# Patient Record
Sex: FEMALE | Race: WHITE | NOT HISPANIC OR LATINO
[De-identification: names, ages, dates, MRNs, and addresses within clinical notes are randomized per-mention and may not be internally consistent; named-entity substitution may affect disease eponyms.]

---

## 2017-01-30 ENCOUNTER — MEDICATION RENEWAL (OUTPATIENT)
Age: 67
End: 2017-01-30

## 2017-04-05 ENCOUNTER — APPOINTMENT (OUTPATIENT)
Dept: FAMILY MEDICINE | Facility: CLINIC | Age: 67
End: 2017-04-05

## 2017-08-16 ENCOUNTER — APPOINTMENT (OUTPATIENT)
Dept: ELECTROPHYSIOLOGY | Facility: CLINIC | Age: 67
End: 2017-08-16
Payer: MEDICARE

## 2017-08-16 VITALS
HEART RATE: 94 BPM | WEIGHT: 117 LBS | SYSTOLIC BLOOD PRESSURE: 100 MMHG | DIASTOLIC BLOOD PRESSURE: 50 MMHG | HEIGHT: 60 IN | BODY MASS INDEX: 22.97 KG/M2

## 2017-08-16 DIAGNOSIS — I47.1 SUPRAVENTRICULAR TACHYCARDIA: ICD-10-CM

## 2017-08-16 PROCEDURE — 93000 ELECTROCARDIOGRAM COMPLETE: CPT | Mod: 59

## 2017-08-16 PROCEDURE — 93280 PM DEVICE PROGR EVAL DUAL: CPT

## 2017-08-16 PROCEDURE — 99204 OFFICE O/P NEW MOD 45 MIN: CPT

## 2019-04-17 ENCOUNTER — APPOINTMENT (OUTPATIENT)
Dept: PULMONOLOGY | Facility: CLINIC | Age: 69
End: 2019-04-17

## 2020-06-03 ENCOUNTER — APPOINTMENT (OUTPATIENT)
Dept: FAMILY MEDICINE | Facility: CLINIC | Age: 70
End: 2020-06-03
Payer: MEDICARE

## 2020-06-03 VITALS
DIASTOLIC BLOOD PRESSURE: 62 MMHG | RESPIRATION RATE: 16 BRPM | SYSTOLIC BLOOD PRESSURE: 102 MMHG | HEIGHT: 60 IN | WEIGHT: 118 LBS | HEART RATE: 82 BPM | OXYGEN SATURATION: 95 % | BODY MASS INDEX: 23.16 KG/M2

## 2020-06-03 DIAGNOSIS — R35.0 FREQUENCY OF MICTURITION: ICD-10-CM

## 2020-06-03 DIAGNOSIS — Z72.0 TOBACCO USE: ICD-10-CM

## 2020-06-03 DIAGNOSIS — Z00.00 ENCOUNTER FOR GENERAL ADULT MEDICAL EXAMINATION W/OUT ABNORMAL FINDINGS: ICD-10-CM

## 2020-06-03 LAB
BILIRUB UR QL STRIP: NEGATIVE
GLUCOSE UR-MCNC: NEGATIVE
HCG UR QL: 0.2 EU/DL
HGB UR QL STRIP.AUTO: NEGATIVE
KETONES UR-MCNC: NEGATIVE
LEUKOCYTE ESTERASE UR QL STRIP: NEGATIVE
NITRITE UR QL STRIP: NEGATIVE
PH UR STRIP: 5.5
PROT UR STRIP-MCNC: NEGATIVE
SP GR UR STRIP: 1.02

## 2020-06-03 PROCEDURE — 81002 URINALYSIS NONAUTO W/O SCOPE: CPT

## 2020-06-03 PROCEDURE — G0439: CPT

## 2020-06-03 RX ORDER — CYCLOBENZAPRINE HYDROCHLORIDE 5 MG/1
5 TABLET, FILM COATED ORAL 3 TIMES DAILY
Qty: 60 | Refills: 0 | Status: DISCONTINUED | COMMUNITY
Start: 2017-01-30 | End: 2020-06-03

## 2020-06-03 RX ORDER — ASPIRIN 325 MG/1
325 TABLET, COATED ORAL
Refills: 0 | Status: DISCONTINUED | COMMUNITY
End: 2020-06-03

## 2020-06-03 NOTE — HISTORY OF PRESENT ILLNESS
[de-identified] : Ms. LOUISE presents for annual physical.\par Transitioning care.\par Recently saw Cardiology-Dr. Richey.  \par Used to see Dr. Berman-will have records sent. \par \par Was on Ativan at one point last year for difficulty sleeping.  Has had some issues with sleeping since  passed 5 years ago. \par Medications and allergies reviewed.  Will check if taking ER metoprolol when she gets home. \par Recent UTI treated 3 weeks ago at  with Keflex. \par Prior to that earlier in the year had UTI treated with macrobid.  Always has urinary frequency. \par  [FreeTextEntry1] : Ms. LOUISE presents for annual physical.\par

## 2020-06-03 NOTE — HEALTH RISK ASSESSMENT
[] : Yes [No] : No [Patient reported mammogram was normal] : Patient reported mammogram was normal [Patient reported colonoscopy was normal] : Patient reported colonoscopy was normal [Alone] : lives alone [# of Members in Household ___] :  household currently consist of [unfilled] member(s) [] :  [# Of Children ___] : has [unfilled] children [Fully functional (bathing, dressing, toileting, transferring, walking, feeding)] : Fully functional (bathing, dressing, toileting, transferring, walking, feeding) [Fully functional (using the telephone, shopping, preparing meals, housekeeping, doing laundry, using] : Fully functional and needs no help or supervision to perform IADLs (using the telephone, shopping, preparing meals, housekeeping, doing laundry, using transportation, managing medications and managing finances) [de-identified] : Cardio [de-identified] : Not formally exercising [de-identified] : 2-3 cigarettes per day [de-identified] : Varied [Reports changes in vision] : Reports no changes in vision [MammogramComments] : 3 years ago [PapSmearComments] : 3 years ago [BoneDensityComments] : over 2 years ago [ColonoscopyComments] : 5 years ago [de-identified] : Recent cataract surgery

## 2020-06-03 NOTE — PHYSICAL EXAM
[Normal Oropharynx] : the oropharynx was normal [No Edema] : there was no peripheral edema [No Extremity Clubbing/Cyanosis] : no extremity clubbing/cyanosis [Normal] : affect was normal and insight and judgment were intact [de-identified] : cerumen right ear [de-identified] : left paraspinal lumbar tenderness to palpation

## 2020-06-03 NOTE — PLAN
[FreeTextEntry1] : Patient not fasting.  Will go to lab for fasting bloodwork, rx given.  Will go tomorrow morning. \par \par UA clear in office-will send for culture.  Recommend Urology eval for ongoing urinary frequency. \par \par Needs mammo, sono, DEXA.\par Needs Colonoscopy and to re-establish with Gyn. \par \par Interested in Lung Cancer screening.  Discussed CT scan-patient will consider.

## 2020-06-04 LAB — BACTERIA UR CULT: NORMAL

## 2020-12-02 ENCOUNTER — APPOINTMENT (OUTPATIENT)
Dept: ELECTROPHYSIOLOGY | Facility: CLINIC | Age: 70
End: 2020-12-02
Payer: MEDICARE

## 2020-12-02 VITALS
HEART RATE: 66 BPM | BODY MASS INDEX: 23.75 KG/M2 | DIASTOLIC BLOOD PRESSURE: 72 MMHG | HEIGHT: 60 IN | SYSTOLIC BLOOD PRESSURE: 128 MMHG | WEIGHT: 121 LBS

## 2020-12-02 DIAGNOSIS — Z45.010 ENCOUNTER FOR CHECKING AND TESTING OF CARDIAC PACEMAKER PULSE GENERATOR [BATTERY]: ICD-10-CM

## 2020-12-02 PROCEDURE — 99072 ADDL SUPL MATRL&STAF TM PHE: CPT

## 2020-12-02 PROCEDURE — 93000 ELECTROCARDIOGRAM COMPLETE: CPT

## 2020-12-02 PROCEDURE — 99204 OFFICE O/P NEW MOD 45 MIN: CPT

## 2020-12-02 RX ORDER — METOPROLOL SUCCINATE 25 MG/1
25 TABLET, EXTENDED RELEASE ORAL
Refills: 0 | Status: ACTIVE | COMMUNITY

## 2020-12-02 NOTE — REASON FOR VISIT
[Consultation] : a consultation regarding [Pacemaker Evaluation] : pacemaker ~T evaluation ~C was performed [FreeTextEntry1] : ref: Dr. Richey [Family Member] : family member

## 2020-12-02 NOTE — REVIEW OF SYSTEMS
[Fever] : no fever [Chills] : no chills [Feeling Fatigued] : feeling fatigued [Shortness Of Breath] : no shortness of breath [Dyspnea on exertion] : not dyspnea during exertion [Chest Pain] : no chest pain [Lower Ext Edema] : no extremity edema [Palpitations] : palpitations [Dizziness] : dizziness [Confusion] : no confusion was observed [Anxiety] : no anxiety [Easy Bleeding] : no tendency for easy bleeding [Easy Bruising] : no tendency for easy bruising [Negative] : Integumentary

## 2020-12-02 NOTE — DISCUSSION/SUMMARY
[FreeTextEntry1] : 70 year old woman with history of SVT s/p remote AVJ ablation and PPM implant, presenting for PPM management.  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 She will require pacemaker generator replacement in the near future, particularly given her pacemaker dependence. She does have preserved LV function, and feels generally well with nml dual chamber ppm function. We discussed pacemaker generator replacement, as well as procedure related risks such as bleeding, infection, and lead damage. She expressed understanding and wants to proceed asap. \par -PPM Gen change\par -f/u after above\par

## 2020-12-02 NOTE — PHYSICAL EXAM
[General Appearance - Well Developed] : well developed [Normal Appearance] : normal appearance [Well Groomed] : well groomed [General Appearance - Well Nourished] : well nourished [General Appearance - In No Acute Distress] : no acute distress [Normal Conjunctiva] : the conjunctiva exhibited no abnormalities [Normal Oropharynx] : normal oropharynx [Normal Jugular Venous V Waves Present] : normal jugular venous V waves present [Respiration, Rhythm And Depth] : normal respiratory rhythm and effort [Auscultation Breath Sounds / Voice Sounds] : lungs were clear to auscultation bilaterally [Heart Sounds] : normal S1 and S2 [Murmurs] : no murmurs present [Arterial Pulses Normal] : the arterial pulses were normal [Edema] : no peripheral edema present [Bowel Sounds] : normal bowel sounds [Abdomen Soft] : soft [Abnormal Walk] : normal gait [Nail Clubbing] : no clubbing of the fingernails [Cyanosis, Localized] : no localized cyanosis [Skin Color & Pigmentation] : normal skin color and pigmentation [Skin Turgor] : normal skin turgor [] : no rash [FreeTextEntry1] : left chest ppm implant site well healed [Oriented To Time, Place, And Person] : oriented to person, place, and time [Impaired Insight] : insight and judgment were intact

## 2020-12-07 DIAGNOSIS — Z01.818 ENCOUNTER FOR OTHER PREPROCEDURAL EXAMINATION: ICD-10-CM

## 2020-12-08 ENCOUNTER — APPOINTMENT (OUTPATIENT)
Dept: DISASTER EMERGENCY | Facility: CLINIC | Age: 70
End: 2020-12-08

## 2020-12-08 ENCOUNTER — LABORATORY RESULT (OUTPATIENT)
Age: 70
End: 2020-12-08

## 2020-12-11 ENCOUNTER — TRANSCRIPTION ENCOUNTER (OUTPATIENT)
Age: 70
End: 2020-12-11

## 2020-12-11 ENCOUNTER — OUTPATIENT (OUTPATIENT)
Dept: OUTPATIENT SERVICES | Facility: HOSPITAL | Age: 70
LOS: 1 days | Discharge: HOME | End: 2020-12-11
Payer: MEDICARE

## 2020-12-11 VITALS — HEART RATE: 81 BPM | RESPIRATION RATE: 19 BRPM | SYSTOLIC BLOOD PRESSURE: 127 MMHG | DIASTOLIC BLOOD PRESSURE: 63 MMHG

## 2020-12-11 VITALS
HEART RATE: 64 BPM | SYSTOLIC BLOOD PRESSURE: 135 MMHG | WEIGHT: 121.03 LBS | RESPIRATION RATE: 18 BRPM | OXYGEN SATURATION: 98 % | HEIGHT: 60 IN | DIASTOLIC BLOOD PRESSURE: 60 MMHG | TEMPERATURE: 97 F

## 2020-12-11 DIAGNOSIS — Z98.891 HISTORY OF UTERINE SCAR FROM PREVIOUS SURGERY: Chronic | ICD-10-CM

## 2020-12-11 DIAGNOSIS — Z98.49 CATARACT EXTRACTION STATUS, UNSPECIFIED EYE: Chronic | ICD-10-CM

## 2020-12-11 DIAGNOSIS — Z45.010 ENCOUNTER FOR CHECKING AND TESTING OF CARDIAC PACEMAKER PULSE GENERATOR [BATTERY]: ICD-10-CM

## 2020-12-11 LAB
ANION GAP SERPL CALC-SCNC: 8 MMOL/L — SIGNIFICANT CHANGE UP (ref 5–17)
APTT BLD: 30.3 SEC — SIGNIFICANT CHANGE UP (ref 27.5–35.5)
BUN SERPL-MCNC: 21 MG/DL — HIGH (ref 8–20)
CALCIUM SERPL-MCNC: 9.1 MG/DL — SIGNIFICANT CHANGE UP (ref 8.6–10.2)
CHLORIDE SERPL-SCNC: 108 MMOL/L — HIGH (ref 98–107)
CO2 SERPL-SCNC: 26 MMOL/L — SIGNIFICANT CHANGE UP (ref 22–29)
CREAT SERPL-MCNC: 0.77 MG/DL — SIGNIFICANT CHANGE UP (ref 0.5–1.3)
GLUCOSE SERPL-MCNC: 94 MG/DL — SIGNIFICANT CHANGE UP (ref 70–99)
HCT VFR BLD CALC: 41.1 % — SIGNIFICANT CHANGE UP (ref 34.5–45)
HGB BLD-MCNC: 13.6 G/DL — SIGNIFICANT CHANGE UP (ref 11.5–15.5)
INR BLD: 1.05 RATIO — SIGNIFICANT CHANGE UP (ref 0.88–1.16)
MAGNESIUM SERPL-MCNC: 2.1 MG/DL — SIGNIFICANT CHANGE UP (ref 1.6–2.6)
MCHC RBC-ENTMCNC: 31.6 PG — SIGNIFICANT CHANGE UP (ref 27–34)
MCHC RBC-ENTMCNC: 33.1 GM/DL — SIGNIFICANT CHANGE UP (ref 32–36)
MCV RBC AUTO: 95.6 FL — SIGNIFICANT CHANGE UP (ref 80–100)
PLATELET # BLD AUTO: 213 K/UL — SIGNIFICANT CHANGE UP (ref 150–400)
POTASSIUM SERPL-MCNC: 4.3 MMOL/L — SIGNIFICANT CHANGE UP (ref 3.5–5.3)
POTASSIUM SERPL-SCNC: 4.3 MMOL/L — SIGNIFICANT CHANGE UP (ref 3.5–5.3)
PROTHROM AB SERPL-ACNC: 12.2 SEC — SIGNIFICANT CHANGE UP (ref 10.6–13.6)
RBC # BLD: 4.3 M/UL — SIGNIFICANT CHANGE UP (ref 3.8–5.2)
RBC # FLD: 12.6 % — SIGNIFICANT CHANGE UP (ref 10.3–14.5)
SODIUM SERPL-SCNC: 142 MMOL/L — SIGNIFICANT CHANGE UP (ref 135–145)
WBC # BLD: 7.23 K/UL — SIGNIFICANT CHANGE UP (ref 3.8–10.5)
WBC # FLD AUTO: 7.23 K/UL — SIGNIFICANT CHANGE UP (ref 3.8–10.5)

## 2020-12-11 PROCEDURE — 85730 THROMBOPLASTIN TIME PARTIAL: CPT

## 2020-12-11 PROCEDURE — 36415 COLL VENOUS BLD VENIPUNCTURE: CPT

## 2020-12-11 PROCEDURE — C1785: CPT

## 2020-12-11 PROCEDURE — 33228 REMV&REPLC PM GEN DUAL LEAD: CPT

## 2020-12-11 PROCEDURE — 85027 COMPLETE CBC AUTOMATED: CPT

## 2020-12-11 PROCEDURE — 83735 ASSAY OF MAGNESIUM: CPT

## 2020-12-11 PROCEDURE — 93005 ELECTROCARDIOGRAM TRACING: CPT

## 2020-12-11 PROCEDURE — 80048 BASIC METABOLIC PNL TOTAL CA: CPT

## 2020-12-11 PROCEDURE — 85610 PROTHROMBIN TIME: CPT

## 2020-12-11 PROCEDURE — 93010 ELECTROCARDIOGRAM REPORT: CPT

## 2020-12-11 RX ORDER — SODIUM CHLORIDE 9 MG/ML
500 INJECTION INTRAMUSCULAR; INTRAVENOUS; SUBCUTANEOUS ONCE
Refills: 0 | Status: COMPLETED | OUTPATIENT
Start: 2020-12-11 | End: 2020-12-11

## 2020-12-11 RX ORDER — ONDANSETRON 8 MG/1
4 TABLET, FILM COATED ORAL ONCE
Refills: 0 | Status: COMPLETED | OUTPATIENT
Start: 2020-12-11 | End: 2020-12-11

## 2020-12-11 RX ORDER — KETOROLAC TROMETHAMINE 30 MG/ML
15 SYRINGE (ML) INJECTION ONCE
Refills: 0 | Status: DISCONTINUED | OUTPATIENT
Start: 2020-12-11 | End: 2020-12-11

## 2020-12-11 RX ADMIN — SODIUM CHLORIDE 500 MILLILITER(S): 9 INJECTION INTRAMUSCULAR; INTRAVENOUS; SUBCUTANEOUS at 11:45

## 2020-12-11 RX ADMIN — Medication 15 MILLIGRAM(S): at 12:25

## 2020-12-11 RX ADMIN — ONDANSETRON 4 MILLIGRAM(S): 8 TABLET, FILM COATED ORAL at 11:45

## 2020-12-11 NOTE — DISCHARGE NOTE PROVIDER - NSDCCPTREATMENT_GEN_ALL_CORE_FT
PRINCIPAL PROCEDURE  Procedure: Replacement of dual lead pacemaker battery  Findings and Treatment: Cardiac Device Implant Post Operative Instructions  - Do not touch the incision until it is completely healed.   - There is Dermabond on your incision, which will start to peel off on their own over the next 2-3 weeks. Do not pick at or peel off the Steristrips.   - Bruising around the implant site or over the chest, side or arm near the incision is normal, and will take a few weeks to resolve.  - Do not apply soaps, creams, lotions, ointments or powders to the incision until it is completely healed.  - You may take a shower in 24 hours, and allow the water to run over the incision. However, do not submerge the incision in water: do not swim or soak in bath tubs, hot tubs, swimming pools, etc.   You should call the doctor if:   - You notice redness, drainage, swelling, increased tenderness, hot sensation around the incision, bleeding or incision edges pulling apart.  - Your temperature is greater than 100 degrees F for more than 24 hours.  - You notice swelling or bulging at the incision or around the device that was not there when you left the hospital or is increasing in size.  - You experience increased difficulty breathing.  - You notice new/worsening swelling in your legs and ankles.  - You faint or have dizzy spells.  - You have any questions or concerns regarding your device or the procedure.

## 2020-12-11 NOTE — ASU PATIENT PROFILE, ADULT - PMH
Personal History of Mitral Valve Disease  mitral valve prolapse  s/p AV (Atrioventricular) Roseann Ablation    SVT (supraventricular tachycardia)

## 2020-12-11 NOTE — DISCHARGE NOTE PROVIDER - CARE PROVIDER_API CALL
Shen Johnson  CARDIOLOGY  [address], [place] [address]  Phone: [phone]  Fax: [phone]  Follow Up Time: 2 weeks

## 2020-12-11 NOTE — H&P PST ADULT - NSICDXPASTMEDICALHX_GEN_ALL_CORE_FT
PAST MEDICAL HISTORY:  AF (Atrial Fibrillation)     Personal History of Mitral Valve Disease mitral valve prolapse    s/p AV (Atrioventricular) Roseann Ablation      PAST MEDICAL HISTORY:  Personal History of Mitral Valve Disease mitral valve prolapse    s/p AV (Atrioventricular) Roseann Ablation     SVT (supraventricular tachycardia)

## 2020-12-11 NOTE — H&P PST ADULT - COMMENTS
No fevers, chills, CP, abdominal pain, weight loss or gain, N/V/D, bloody or dark colored stools  + SOB, lightheadedness

## 2020-12-11 NOTE — PROGRESS NOTE ADULT - SUBJECTIVE AND OBJECTIVE BOX
PROCEDURE(S): Pacemaker Generator Change    ELECTRPHYSIOLOGIST(S): Shen Johnson MD    COMPLICATIONS:  none          DISPOSITION:  Observation Unit           CONDITION: Stable      Pt doing well s/p MDT dual chamber generator change. Denies complaint    EKG: A-S V-P at 76bpm    Device: MDT dual chamber pacemaker programmed DDD 60-130bpm; atrial sensitivity 0.3mV, atrial output 1.5V @0.4ms; ventricular sensitivity 0.9mV, ventricular output 2.5V @ 0.4ms    Exam:   VS BP 73/46 R16 P60  General: responsive to verbal stimuli  Incision: Dermabond C/D/I; no bleeding, hematoma, erythema or edema  Card: S1/S2, RRR, no m/g/r  Resp: lungs CTA b/l  Abd: S/NT/ND  Ext: no edema, radial pulses intact b/l    Assessment:   70 year old woman with history of SVT s/p remote AVJ ablation and PPM implant. She initially had highly symptomatic SVT in 1996 and presented with severe palpitation and chest pain, and was found to have tachycardia up to 300 bpm. She was told she had multiple pathways and some close to the AV node, and had multiple attempts at ablation at [facility], but eventually required AV junction ablation and pacemaker implant in 1997 and has been pacemaker dependent since. She has a history of lead fracture resulting in symptomatic bradycardia and underwent lead extraction with new pacing system implant on 5/11/10 (Dr. Thomson, [facility]). She has recently generally been feeling well, with occasional palpitation which has been improved with metoprolol. Over the last two to three weeks she has had fatigue, lightheadedness and palpitation. On evaluation her ppm has reached JANET and has triggered asynchronous pacing mode. She presents electively today  and is now status post uncomplicated generator change.       Plan:   500cc NS IV bolus x 1 given for post anesthesia hypotension  Observation on telemetry per post op protocol.   Resume PO intake.   OOB w/ assist once pt fully awake & VSS.   Pain control with PO analgesia PRN.   Resume home medications.  Keep incision dry for 5 days.  Anticipate d/c home today once all criteria met, with outpt f/up in 2 weeks.    ***BP 97/51 after 500cc NS, will continue to monitor closely in recovery PROCEDURE(S): Pacemaker Generator Change    ELECTRPHYSIOLOGIST(S): Shen Johnson MD    COMPLICATIONS:  none          DISPOSITION:  Observation Unit           CONDITION: Stable      Pt doing well s/p MDT dual chamber generator change. Denies complaint    EKG: A-S V-P at 76bpm    Device: MDT dual chamber pacemaker programmed DDD 60-130bpm; atrial sensitivity 0.3mV, atrial output 1.5V @0.4ms; ventricular sensitivity 0.9mV, ventricular output 2.5V @ 0.4ms    Exam:   VS BP 73/46 R16 P60  General: responsive to verbal stimuli  Incision: Dermabond C/D/I; no bleeding, hematoma, erythema or edema  Card: S1/S2, RRR, no m/g/r  Resp: lungs CTA b/l  Abd: S/NT/ND  Ext: no edema, radial pulses intact b/l    Assessment:   70 year old woman with history of SVT s/p remote AVJ ablation and PPM implant. She initially had highly symptomatic SVT in 1996 and presented with severe palpitation and chest pain, and was found to have tachycardia up to 300 bpm. She was told she had multiple pathways and some close to the AV node, and had multiple attempts at ablation at [facility], but eventually required AV junction ablation and pacemaker implant in 1997 and has been pacemaker dependent since. She has a history of lead fracture resulting in symptomatic bradycardia and underwent lead extraction with new pacing system implant on 5/11/10 (Dr. Thomson, [facility]). She has recently generally been feeling well, with occasional palpitation which has been improved with metoprolol. Over the last two to three weeks she has had fatigue, lightheadedness and palpitation. On evaluation her ppm has reached JANET and has triggered asynchronous pacing mode. She presents electively today  and is now status post uncomplicated generator change.       Plan:   500cc NS IV bolus x 1 given for post anesthesia hypotension  Observation on telemetry per post op protocol.   Resume PO intake.   OOB w/ assist once pt fully awake & VSS.   Pain control with PO analgesia PRN.   Resume home medications.  Keep incision dry for 5 days.  Anticipate d/c home today once all criteria met, with outpt f/up in 2 weeks.    ***/56 after 500cc NS, will continue to monitor closely in recovery

## 2020-12-11 NOTE — H&P PST ADULT - NSICDXPASTSURGICALHX_GEN_ALL_CORE_FT
PAST SURGICAL HISTORY:  Cardiac Pacemaker      PAST SURGICAL HISTORY:  Cardiac Pacemaker MDT dual chamber PPM , lead fx w/ system replacement     H/O  section x1    S/P cataract surgery Right

## 2020-12-11 NOTE — DISCHARGE NOTE PROVIDER - NSDCMRMEDTOKEN_GEN_ALL_CORE_FT
aspirin 81 mg oral tablet: orally once a day  Toprol-XL 25 mg oral tablet, extended release: 1 tab(s) orally once a day

## 2020-12-11 NOTE — DISCHARGE NOTE PROVIDER - NSDCFUADDINST_GEN_ALL_CORE_FT
Our office will contact you in 3-5 days to schedule this appointment. Please call [phone] with questions or concerns.

## 2020-12-11 NOTE — DISCHARGE NOTE PROVIDER - NSDCCPCAREPLAN_GEN_ALL_CORE_FT
PRINCIPAL DISCHARGE DIAGNOSIS  Diagnosis: Pacemaker generator end of life  Assessment and Plan of Treatment:

## 2020-12-11 NOTE — H&P PST ADULT - HISTORY OF PRESENT ILLNESS
70 year old woman with history of SVT s/p remote AVJ ablation and PPM implant. She initially had highly symptomatic SVT in  and presented with severe palpitation and chest pain, and was found to have tachycardia up to 300 bpm. She was told she had multiple pathways and some close to the AV node, and had multiple attempts at ablation at [facility], but eventually required AV junction ablation and pacemaker implant in  and has been pacemaker dependent since. She has a history of lead fracture resulting in symptomatic bradycardia and underwent lead extraction with new pacing system implant on 5/11/10 (Dr. Thomson, [facility]).   She has recently generally been feeling well, with occasional palpitation which has been improved with metoprolol. Over the last two to three weeks she has had fatigue, lightheadedness and palpitation. On evaluation her ppm has reached JANET and has triggered asynchronous pacing mode. She presents electively today for pacemaker generator change.     Cardiology Summary  Interrogation 2020: MDT dual chamber ppm device triggered RRT 20, and has since been in VVI 65 bpm mode. Previously she had 100% ventricular pacing, and 46% atrial pacing in DDD  mode. In the past several high rate atrial episodes were noted c/w frequent PACs. Battery is currently 2.62V. Lead parameters are within normal limits (A 4076, implanted -10, threshold 0.375V@0.4ms, V:3830, implanted 511-10, threshold 1V@0.4ms)  EK20, sinus rhythm with ventricular pacing (asynchronous) 66 bpm   Stress Test: 18, nml,no ischemia, EF 64%   Echo: 11/3/20, LVEF 55%, LA 3cm, mild MR, mild TR, RVSP <35    70 year old woman with history of SVT s/p remote AVJ ablation and PPM implant. She initially had highly symptomatic SVT in  and presented with severe palpitation and chest pain, and was found to have tachycardia up to 300 bpm. She was told she had multiple pathways and some close to the AV node, and had multiple attempts at ablation at [facility], but eventually required AV junction ablation and pacemaker implant in  and has been pacemaker dependent since. She has a history of lead fracture resulting in symptomatic bradycardia and underwent lead extraction with new pacing system implant on 5/11/10 (Dr. Thomson, [facility]). She has recently generally been feeling well, with occasional palpitation which has been improved with metoprolol. Over the last two to three weeks she has had fatigue, lightheadedness and palpitation. On evaluation her ppm has reached JANET and has triggered asynchronous pacing mode. She presents electively today for pacemaker generator change.     Cardiology Summary  Interrogation 2020: MDT dual chamber ppm device triggered RRT 20, and has since been in VVI 65 bpm mode. Previously she had 100% ventricular pacing, and 46% atrial pacing in DDD  mode. In the past several high rate atrial episodes were noted c/w frequent PACs. Battery is currently 2.62V. Lead parameters are within normal limits (A 4076, implanted -10, threshold 0.375V@0.4ms, V:3830, implanted 511-10, threshold 1V@0.4ms)  EK20, sinus rhythm with ventricular pacing (asynchronous) 66 bpm   Stress Test: 18, nml,no ischemia, EF 64%   Echo: 11/3/20, LVEF 55%, LA 3cm, mild MR, mild TR, RVSP <35

## 2020-12-11 NOTE — DISCHARGE NOTE PROVIDER - HOSPITAL COURSE
70 year old woman with history of SVT s/p remote AVJ ablation and PPM implant. She initially had highly symptomatic SVT in 1996 and presented with severe palpitation and chest pain, and was found to have tachycardia up to 300 bpm. She was told she had multiple pathways and some close to the AV node, and had multiple attempts at ablation at [facility], but eventually required AV junction ablation and pacemaker implant in 1997 and has been pacemaker dependent since. She has a history of lead fracture resulting in symptomatic bradycardia and underwent lead extraction with new pacing system implant on 5/11/10 (Dr. Thomson, [facility]). She has recently generally been feeling well, with occasional palpitation which has been improved with metoprolol. Over the last two to three weeks she has had fatigue, lightheadedness and palpitation. On evaluation her ppm has reached JANET and has triggered asynchronous pacing mode. She presents electively today  and is now status post uncomplicated generator change.       Plan:   Pain control with PO analgesia PRN.   Resume home medications.  Keep incision dry for 5 days.  Outpt f/up in 2 weeks.

## 2020-12-11 NOTE — H&P PST ADULT - ASSESSMENT
Plan:  NPO confirmed  Consent w/ Attending  Stat labs & ecg 70 year old woman with history of SVT s/p remote AVJ ablation and PPM implant. She initially had highly symptomatic SVT in 1996 and presented with severe palpitation and chest pain, and was found to have tachycardia up to 300 bpm. She was told she had multiple pathways and some close to the AV node, and had multiple attempts at ablation at [facility], but eventually required AV junction ablation and pacemaker implant in 1997 and has been pacemaker dependent since. She has a history of lead fracture resulting in symptomatic bradycardia and underwent lead extraction with new pacing system implant on 5/11/10 (Dr. Thomson, [facility]). She has recently generally been feeling well, with occasional palpitation which has been improved with metoprolol. Over the last two to three weeks she has had fatigue, lightheadedness and palpitation. On evaluation her ppm has reached JANET and has triggered asynchronous pacing mode. She presents electively today for pacemaker generator change.     Plan:  NPO confirmed  Consent w/ Attending  Stat labs & ecg

## 2020-12-11 NOTE — ASU PATIENT PROFILE, ADULT - PSH
Cardiac Pacemaker  MDT dual chamber PPM , lead fx w/ system replacement   H/O  section  x1  S/P cataract surgery  Right

## 2020-12-11 NOTE — DISCHARGE NOTE NURSING/CASE MANAGEMENT/SOCIAL WORK - PATIENT PORTAL LINK FT
You can access the FollowMyHealth Patient Portal offered by [facility] by registering at the following website: [email or web address]. By joining [employer]’s FollowMyHealth portal, you will also be able to view your health information using other applications (apps) compatible with our system.

## 2020-12-16 ENCOUNTER — APPOINTMENT (OUTPATIENT)
Dept: OBGYN | Facility: CLINIC | Age: 70
End: 2020-12-16

## 2020-12-30 PROBLEM — I47.1 SUPRAVENTRICULAR TACHYCARDIA: Chronic | Status: ACTIVE | Noted: 2020-12-11

## 2020-12-31 ENCOUNTER — APPOINTMENT (OUTPATIENT)
Dept: ELECTROPHYSIOLOGY | Facility: CLINIC | Age: 70
End: 2020-12-31
Payer: MEDICARE

## 2020-12-31 VITALS
SYSTOLIC BLOOD PRESSURE: 136 MMHG | TEMPERATURE: 98.9 F | DIASTOLIC BLOOD PRESSURE: 74 MMHG | BODY MASS INDEX: 23.56 KG/M2 | HEIGHT: 60 IN | HEART RATE: 67 BPM | OXYGEN SATURATION: 98 % | WEIGHT: 120 LBS

## 2020-12-31 DIAGNOSIS — I44.2 ATRIOVENTRICULAR BLOCK, COMPLETE: ICD-10-CM

## 2020-12-31 PROCEDURE — 93280 PM DEVICE PROGR EVAL DUAL: CPT

## 2020-12-31 PROCEDURE — 99213 OFFICE O/P EST LOW 20 MIN: CPT

## 2020-12-31 PROCEDURE — 99072 ADDL SUPL MATRL&STAF TM PHE: CPT

## 2020-12-31 RX ORDER — FLUTICASONE PROPIONATE AND SALMETEROL 50; 250 UG/1; UG/1
250-50 POWDER RESPIRATORY (INHALATION)
Refills: 0 | Status: DISCONTINUED | COMMUNITY
Start: 2020-12-02 | End: 2020-12-31

## 2020-12-31 RX ORDER — ASPIRIN 81 MG
81 TABLET, DELAYED RELEASE (ENTERIC COATED) ORAL DAILY
Refills: 0 | Status: ACTIVE | COMMUNITY

## 2020-12-31 RX ORDER — MECLIZINE HYDROCHLORIDE 12.5 MG/1
12.5 TABLET ORAL
Refills: 0 | Status: DISCONTINUED | COMMUNITY
Start: 2020-12-02 | End: 2020-12-31

## 2020-12-31 RX ORDER — ROSUVASTATIN CALCIUM 5 MG/1
5 TABLET, FILM COATED ORAL
Qty: 30 | Refills: 11 | Status: ACTIVE | COMMUNITY

## 2021-01-04 ENCOUNTER — NON-APPOINTMENT (OUTPATIENT)
Age: 71
End: 2021-01-04

## 2021-01-08 NOTE — PHYSICAL EXAM
[General Appearance - Well Developed] : well developed [General Appearance - Well Nourished] : well nourished [] : no respiratory distress [Left Infraclavicular] : left infraclavicular area [Clean] : clean [Dry] : dry [Healing Well] : healing well [Well-Healed] : well-healed [Palpable Crepitus] : palpable crepitus [Bleeding] : no active bleeding [Foul Odor] : no foul smell [Purulent Drainage] : no purulent drainage [Serosanguineous Drainage] : no serosanquineous drainage [Serous Drainage] : no serous drainage [Erythema] : not erythematous [Warm] : not warm [Tender] : not tender [Indurated] : not indurated [Fluctuant] : not fluctuant [FreeTextEntry2] : NO LUE swelling

## 2021-01-08 NOTE — PROCEDURE
[Complete Heart Block] : complete heart block [See Device Printout] : See device printout [Pacemaker] : pacemaker [Medtronic] : Medtronic [Normal] : The battery status is normal. [Lead Imp:  ___ohms] : lead impedance was [unfilled] ohms [Sensing Amplitude ___mv] : sensing amplitude was [unfilled] mv [___V @] : [unfilled] V [___ ms] : [unfilled] ms [None] : none [de-identified] : Roxanne [de-identified] : [id number] [de-identified] : 12/22/2020 [de-identified] : Normal function. NO significant events

## 2021-01-08 NOTE — REVIEW OF SYSTEMS
[Negative] : Cardiovascular [Fever] : no fever [Chills] : no chills [Feeling Fatigued] : not feeling fatigued [Shortness Of Breath] : no shortness of breath [Dyspnea on exertion] : not dyspnea during exertion [Chest  Pressure] : no chest pressure [Chest Pain] : no chest pain [Lower Ext Edema] : no extremity edema [Palpitations] : no palpitations

## 2021-01-08 NOTE — HISTORY OF PRESENT ILLNESS
[de-identified] : 70 year old woman with history of highly symptomatic SVT s/p remote AVJ ablation and PPM implant in 1997 and has been pacemaker dependent since. She has a history of lead fracture resulting in symptomatic bradycardia and underwent lead extraction with new pacing system implant on 5/11/10 (Dr. Thomson, [facility]). Her pacemaker was noted to have reached JANET and reverted to VVI asynchronous pacing, during which she was symptomatic. Now s/p routine generator change 12/11/20 with Dr. Johnson.\par \par Began to notice left arm pain immediately following procedure and SSH which has continued to progress.  Notes sharp pain under the axilla that radiates down left arm. Pain radiates to left forearm and can be severe.   Described as burning and sharp.  Tylenol and Motrin provided minimal relief. \par Seen and [facility] two days ago. LUE US was negative for DVT.  Started on Gabapentin and Percocet.  Does not tolerate Percocet.  Has taking a few doses of Gabapentin and started noted symptomatic improvement.

## 2021-01-08 NOTE — DISCUSSION/SUMMARY
[FreeTextEntry1] : 70 year old woman with history of highly symptomatic SVT s/p remote AVJ ablation and PPM implant in 1997 and has been pacemaker dependent since. She has a history of lead fracture resulting in symptomatic bradycardia and underwent lead extraction with new pacing system implant on 5/11/10 (Dr. Thomson, [facility]). Her pacemaker was noted to have reached JANET and reverted to VVI asynchronous pacing, during which she was symptomatic. Now s/p routine generator change 12/11/20 with Dr. Johnson.\par \par Began to notice left arm pain immediately following procedure and SSH which has continued to progress.  Notes sharp pain under the axilla that radiates down left arm. Pain radiates to left forearm and can be severe.   Described as burning and sharp.  Tylenol and Motrin provided minimal relief. \par Seen and [facility] two days ago. LUE US was negative for DVT.  Started on Gabapentin and Percocet by [facility].  Does not tolerate percocet.  Has taking a few doses of Gabapentin and started to notice symptomatic improvement. \par \par Implant site is benign with no signs of hematoma or infection.  No LUE swelling, pulses and sensation intact.  Symptoms c/w neuropathic pain, however, trigger is unclear.  Unlikely related to device, given only a routine generator change. Possible that positioning during procedure or hospitalization resulted in pinched nerve.\par \par - Advised to continue Gabapentin and increase to TID as advised by [facility]\par - Will continue to monitor and F/up with Dr. Johnson as planned in 2 weeks\par - Discussed orthopedic or pain management consolation. Patient would like to start trial of medication and d/w Dr. Johnson at next f/up if not resolved.\par - Device showed normal function. NO significant events\par \par Yanet Johnson PAC\par \par

## 2021-01-13 ENCOUNTER — APPOINTMENT (OUTPATIENT)
Dept: ELECTROPHYSIOLOGY | Facility: CLINIC | Age: 71
End: 2021-01-13

## 2021-03-09 ENCOUNTER — NON-APPOINTMENT (OUTPATIENT)
Age: 71
End: 2021-03-09

## 2021-04-16 ENCOUNTER — APPOINTMENT (OUTPATIENT)
Dept: OBGYN | Facility: CLINIC | Age: 71
End: 2021-04-16
Payer: MEDICARE

## 2021-04-16 VITALS
DIASTOLIC BLOOD PRESSURE: 64 MMHG | WEIGHT: 123 LBS | BODY MASS INDEX: 24.15 KG/M2 | HEIGHT: 60 IN | SYSTOLIC BLOOD PRESSURE: 137 MMHG

## 2021-04-16 DIAGNOSIS — Z63.5 DISRUPTION OF FAMILY BY SEPARATION AND DIVORCE: ICD-10-CM

## 2021-04-16 DIAGNOSIS — Z01.419 ENCOUNTER FOR GYNECOLOGICAL EXAMINATION (GENERAL) (ROUTINE) W/OUT ABNORMAL FINDINGS: ICD-10-CM

## 2021-04-16 DIAGNOSIS — Z63.4 DISAPPEARANCE AND DEATH OF FAMILY MEMBER: ICD-10-CM

## 2021-04-16 DIAGNOSIS — Z86.39 PERSONAL HISTORY OF OTHER ENDOCRINE, NUTRITIONAL AND METABOLIC DISEASE: ICD-10-CM

## 2021-04-16 PROCEDURE — 99387 INIT PM E/M NEW PAT 65+ YRS: CPT

## 2021-04-16 PROCEDURE — 99072 ADDL SUPL MATRL&STAF TM PHE: CPT

## 2021-04-16 SDOH — SOCIAL STABILITY - SOCIAL INSECURITY: DISSAPEARANCE AND DEATH OF FAMILY MEMBER: Z63.4

## 2021-04-16 SDOH — SOCIAL STABILITY - SOCIAL INSECURITY: DISRUPTION OF FAMILY BY SEPARATION AND DIVORCE: Z63.5

## 2021-04-16 NOTE — REVIEW OF SYSTEMS
[Negative] : Heme/Lymph [FreeTextEntry5] : pace a pacer [FreeTextEntry8] : always goes frequently after having her chidren

## 2021-04-16 NOTE — PHYSICAL EXAM
[Appropriately responsive] : appropriately responsive [Alert] : alert [No Acute Distress] : no acute distress [No Lymphadenopathy] : no lymphadenopathy [Regular Rate Rhythm] : regular rate rhythm [Clear to Auscultation B/L] : clear to auscultation bilaterally [Soft] : soft [Non-tender] : non-tender [Oriented x3] : oriented x3 [FreeTextEntry3] : no thyronegaly [FreeTextEntry7] : no organomegaly [Examination Of The Breasts] : a normal appearance [No Masses] : no breast masses were palpable [Labia Majora] : normal [Labia Minora] : normal [Normal] : normal [Atrophy] : atrophy [FreeTextEntry4] : used small amount of lubricant on speculum [FreeTextEntry5] : non tender  PAP done [FreeTextEntry6] : no uterine or adnexal masses [FreeTextEntry8] : nl bimaual

## 2021-04-16 NOTE — DISCUSSION/SUMMARY
[FreeTextEntry1] : 71 yr old new patient and has 2 children 1980 boy vagina and 1984 girl C-Sec. Patient has back and knee osteoarthritis. She has a Pacer and smoke  1/2 pk a day. Very nice patient and complete exam. Referral for mammography and bone density test.

## 2021-04-22 LAB — CYTOLOGY CVX/VAG DOC THIN PREP: ABNORMAL

## 2021-05-04 DIAGNOSIS — Z87.39 PERSONAL HISTORY OF OTHER DISEASES OF THE MUSCULOSKELETAL SYSTEM AND CONNECTIVE TISSUE: ICD-10-CM

## 2021-05-04 DIAGNOSIS — M81.0 AGE-RELATED OSTEOPOROSIS W/OUT CURRENT PATHOLOGICAL FRACTURE: ICD-10-CM

## 2021-05-04 DIAGNOSIS — R10.9 UNSPECIFIED ABDOMINAL PAIN: ICD-10-CM

## 2021-05-07 ENCOUNTER — NON-APPOINTMENT (OUTPATIENT)
Age: 71
End: 2021-05-07

## 2021-05-11 ENCOUNTER — NON-APPOINTMENT (OUTPATIENT)
Age: 71
End: 2021-05-11

## 2021-05-13 ENCOUNTER — NON-APPOINTMENT (OUTPATIENT)
Age: 71
End: 2021-05-13

## 2021-06-09 ENCOUNTER — APPOINTMENT (OUTPATIENT)
Dept: GASTROENTEROLOGY | Facility: CLINIC | Age: 71
End: 2021-06-09
Payer: MEDICARE

## 2021-06-09 VITALS
RESPIRATION RATE: 14 BRPM | DIASTOLIC BLOOD PRESSURE: 60 MMHG | OXYGEN SATURATION: 95 % | BODY MASS INDEX: 23.75 KG/M2 | HEART RATE: 71 BPM | SYSTOLIC BLOOD PRESSURE: 126 MMHG | HEIGHT: 60 IN | WEIGHT: 121 LBS

## 2021-06-09 DIAGNOSIS — R10.9 UNSPECIFIED ABDOMINAL PAIN: ICD-10-CM

## 2021-06-09 DIAGNOSIS — R10.13 EPIGASTRIC PAIN: ICD-10-CM

## 2021-06-09 DIAGNOSIS — R19.8 OTHER SPECIFIED SYMPTOMS AND SIGNS INVOLVING THE DIGESTIVE SYSTEM AND ABDOMEN: ICD-10-CM

## 2021-06-09 PROCEDURE — 99204 OFFICE O/P NEW MOD 45 MIN: CPT

## 2021-06-09 PROCEDURE — 99072 ADDL SUPL MATRL&STAF TM PHE: CPT

## 2021-06-10 ENCOUNTER — RESULT REVIEW (OUTPATIENT)
Age: 71
End: 2021-06-10

## 2021-06-10 NOTE — HISTORY OF PRESENT ILLNESS
[de-identified] : 71 year old mother of 2,  x 1, with history of SVT s/p remote AVJ ablation and PPM implant, COPD, active smoker, hypercholesteremia, HTN, osteoporosis who presents for upper abdominal pain. \par \par Since past couple of months she has had epigastric pain - burning pain - a few times a week.  No nausea, no vomiting.  No regurgitation.  No dysphagia, no odynophagia.  No loss of appetite, no weight loss. Pain is intermittent - pain can occur without eating.  Was taking Prilosec for 2 weeks - did not help.  \par \par She also reports having left side pain in the abdomen - since past couple pain - pain feels different than the epigastric pain.  Sometimes it feels like spasm, or a gas pocket.  Pain does not improve after a bowel movement. \par \par She has been belching more often. Feels gassy. \par \par Saw her GYN and told she has a 'density on the left side of abdomen.  \par \par 2021 Bone scan - small radiodensities overlying the left pedrito abdomen which are incomplete characterized on this study.  Abdominal radiographs recommended by radiologist.  \par For many she has had irregular bowel movement - constipation and sometimes bowel urgency - irregular bowel movement have been the same since over the years.  \par \par \par She has had 3 colonoscopies in her life time - last colonoscopy 7 years ago - no polyps ever found.  She was recommended to have a repeat exam 5 years from last exam. \par \par She has also had an upper endoscopy - 2 in total - last exam was 7 years ago - she recalls being told that she had a "redness in the esophagitis."  She was recommended to have a follow up endoscopy but she did not.  \par \par She has been under stress since the loss of her  - she would like to quit smoking which has increased since the loss of her .  \par \par \par Patient denies family history of GI cancers.\par \par All other review of systems are negative.  Denies cardiac symptoms.

## 2021-06-10 NOTE — ASSESSMENT
[FreeTextEntry1] : IMPRESSION: \par #  Epigastric pain since past couple of months - burning pain - did not get better with Prilosec \par -  EGD 7 years ago - Esophagitis reported - was to have a follow up endoscopy but did not\par \par #  Left sided abdominal pain since past couple of months \par -   Bone scan 2021 - small radiodensities overlying the left pedrito abdomen which are incomplete characterized on this study. Abdominal radiographs recommended by radiologist. \par \par #  For many she has had irregular bowel movement - constipation and sometimes bowel urgency - irregular bowel movement have been the same since over the years. \par \par #  Irregular bowel habits - since many years \par -  She has had 3 colonoscopies in her life time - last colonoscopy 7 years ago - no polyps ever found. She was recommended to have a repeat exam 5 years from last exam. \par \par #  No family history of DIgestive cancers. \par \par #  Past surgeries:   x 1\par \par #  Comorbidities:  History of SVT s/p remote AVJ ablation and PPM implant, COPD, active smoker, hypercholesteremia, HTN, osteoporosis\par \par \par PLAN: \par CT scan of the abd/pelvis with IV contrast.  Blood test. \par  \par I will plan for an upper endoscopy under monitored anesthesia care to rule out peptic ulcer disease, H.pylori gastritis etc.   Risks, benefits, and alternatives of the procedure were discussed with the patient. Patient understands and agrees to proceed with the planned procedure.\par \par I have advised the patient to arrange for a colonoscopy to rule out colon polyps, colorectal cancer etc. under monitored anesthesia care.  Risks such as perforation  (4 in 10,000) requiring surgery, bleeding (8 in 10,000), infection, diverticulitis, colitis, missed colon cancer (2% to 6%), internal organ injury, etc, risks of bowel prep including colitis, syncope, adverse reaction to medication etc. and risks of anesthesia including cardiopulmonary compromise were discussed with patient.  Patient verbalized understanding and agrees to proceed with the planned procedure.\par \par She would need cardiologist and pacemaker interrogation.  \par \par She has been under stress since the loss of her  - she would like to quit smoking which has increased since the loss of her .  She was advised to followup with her PCP.

## 2021-06-10 NOTE — PHYSICAL EXAM
[General Appearance - Alert] : alert [General Appearance - In No Acute Distress] : in no acute distress [Sclera] : the sclera and conjunctiva were normal [Extraocular Movements] : extraocular movements were intact [Outer Ear] : the ears and nose were normal in appearance [Hearing Threshold Finger Rub Not [place]] : hearing was normal [Neck Appearance] : the appearance of the neck was normal [Neck Cervical Mass (___cm)] : no neck mass was observed [Auscultation Breath Sounds / Voice Sounds] : lungs were clear to auscultation bilaterally [Heart Rate And Rhythm] : heart rate was normal and rhythm regular [Heart Sounds] : normal S1 and S2 [Heart Sounds Gallop] : no gallops [Murmurs] : no murmurs [Heart Sounds Pericardial Friction Rub] : no pericardial rub [Bowel Sounds] : normal bowel sounds [Abdomen Soft] : soft [Abdomen Tenderness] : non-tender [Abdomen Mass (___ Cm)] : no abdominal mass palpated [Cervical Lymph Nodes Enlarged Posterior Bilaterally] : posterior cervical [Cervical Lymph Nodes Enlarged Anterior Bilaterally] : anterior cervical [Supraclavicular Lymph Nodes Enlarged Bilaterally] : supraclavicular [Abnormal Walk] : normal gait [Nail Clubbing] : no clubbing  or cyanosis of the fingernails [Skin Color & Pigmentation] : normal skin color and pigmentation [] : no rash [Oriented To Time, Place, And Person] : oriented to person, place, and time [Impaired Insight] : insight and judgment were intact [Affect] : the affect was normal [FreeTextEntry1] : midline scar

## 2021-06-11 ENCOUNTER — APPOINTMENT (OUTPATIENT)
Dept: CT IMAGING | Facility: CLINIC | Age: 71
End: 2021-06-11
Payer: MEDICARE

## 2021-06-11 ENCOUNTER — TRANSCRIPTION ENCOUNTER (OUTPATIENT)
Age: 71
End: 2021-06-11

## 2021-06-11 ENCOUNTER — NON-APPOINTMENT (OUTPATIENT)
Age: 71
End: 2021-06-11

## 2021-06-11 ENCOUNTER — OUTPATIENT (OUTPATIENT)
Dept: OUTPATIENT SERVICES | Facility: HOSPITAL | Age: 71
LOS: 1 days | End: 2021-06-11
Payer: MEDICARE

## 2021-06-11 DIAGNOSIS — Z98.891 HISTORY OF UTERINE SCAR FROM PREVIOUS SURGERY: Chronic | ICD-10-CM

## 2021-06-11 DIAGNOSIS — R10.9 UNSPECIFIED ABDOMINAL PAIN: ICD-10-CM

## 2021-06-11 DIAGNOSIS — Z98.49 CATARACT EXTRACTION STATUS, UNSPECIFIED EYE: Chronic | ICD-10-CM

## 2021-06-11 PROCEDURE — 82565 ASSAY OF CREATININE: CPT

## 2021-06-11 PROCEDURE — 74177 CT ABD & PELVIS W/CONTRAST: CPT

## 2021-06-11 PROCEDURE — 74177 CT ABD & PELVIS W/CONTRAST: CPT | Mod: 26

## 2021-06-12 ENCOUNTER — INPATIENT (INPATIENT)
Facility: HOSPITAL | Age: 71
LOS: 3 days | Discharge: HOME | DRG: 336 | End: 2021-06-16
Attending: SURGERY | Admitting: SURGERY
Payer: MEDICARE

## 2021-06-12 ENCOUNTER — TRANSCRIPTION ENCOUNTER (OUTPATIENT)
Age: 71
End: 2021-06-12

## 2021-06-12 VITALS
HEART RATE: 80 BPM | DIASTOLIC BLOOD PRESSURE: 69 MMHG | RESPIRATION RATE: 18 BRPM | OXYGEN SATURATION: 95 % | WEIGHT: 121.92 LBS | HEIGHT: 60 IN | SYSTOLIC BLOOD PRESSURE: 148 MMHG | TEMPERATURE: 98 F

## 2021-06-12 DIAGNOSIS — K56.609 UNSPECIFIED INTESTINAL OBSTRUCTION, UNSPECIFIED AS TO PARTIAL VERSUS COMPLETE OBSTRUCTION: ICD-10-CM

## 2021-06-12 DIAGNOSIS — Z98.49 CATARACT EXTRACTION STATUS, UNSPECIFIED EYE: Chronic | ICD-10-CM

## 2021-06-12 DIAGNOSIS — Z98.891 HISTORY OF UTERINE SCAR FROM PREVIOUS SURGERY: Chronic | ICD-10-CM

## 2021-06-12 LAB
ALBUMIN SERPL ELPH-MCNC: 3.8 G/DL — SIGNIFICANT CHANGE UP (ref 3.3–5)
ALP SERPL-CCNC: 80 U/L — SIGNIFICANT CHANGE UP (ref 40–120)
ALT FLD-CCNC: 8 U/L — LOW (ref 12–78)
ANION GAP SERPL CALC-SCNC: 8 MMOL/L — SIGNIFICANT CHANGE UP (ref 5–17)
APPEARANCE UR: CLEAR — SIGNIFICANT CHANGE UP
APTT BLD: 30.9 SEC — SIGNIFICANT CHANGE UP (ref 27.5–35.5)
AST SERPL-CCNC: 17 U/L — SIGNIFICANT CHANGE UP (ref 15–37)
BASOPHILS # BLD AUTO: 0.04 K/UL — SIGNIFICANT CHANGE UP (ref 0–0.2)
BASOPHILS NFR BLD AUTO: 0.9 % — SIGNIFICANT CHANGE UP (ref 0–2)
BILIRUB SERPL-MCNC: 0.4 MG/DL — SIGNIFICANT CHANGE UP (ref 0.2–1.2)
BILIRUB UR-MCNC: NEGATIVE — SIGNIFICANT CHANGE UP
BLD GP AB SCN SERPL QL: SIGNIFICANT CHANGE UP
BUN SERPL-MCNC: 20 MG/DL — SIGNIFICANT CHANGE UP (ref 7–23)
CALCIUM SERPL-MCNC: 8.9 MG/DL — SIGNIFICANT CHANGE UP (ref 8.5–10.1)
CHLORIDE SERPL-SCNC: 108 MMOL/L — SIGNIFICANT CHANGE UP (ref 96–108)
CO2 SERPL-SCNC: 24 MMOL/L — SIGNIFICANT CHANGE UP (ref 22–31)
COLOR SPEC: YELLOW — SIGNIFICANT CHANGE UP
CREAT SERPL-MCNC: 0.87 MG/DL — SIGNIFICANT CHANGE UP (ref 0.5–1.3)
DIFF PNL FLD: NEGATIVE — SIGNIFICANT CHANGE UP
EOSINOPHIL # BLD AUTO: 0.18 K/UL — SIGNIFICANT CHANGE UP (ref 0–0.5)
EOSINOPHIL NFR BLD AUTO: 3.9 % — SIGNIFICANT CHANGE UP (ref 0–6)
GLUCOSE SERPL-MCNC: 97 MG/DL — SIGNIFICANT CHANGE UP (ref 70–99)
GLUCOSE UR QL: NEGATIVE — SIGNIFICANT CHANGE UP
HCT VFR BLD CALC: 42.1 % — SIGNIFICANT CHANGE UP (ref 34.5–45)
HGB BLD-MCNC: 14 G/DL — SIGNIFICANT CHANGE UP (ref 11.5–15.5)
IMM GRANULOCYTES NFR BLD AUTO: 0.2 % — SIGNIFICANT CHANGE UP (ref 0–1.5)
INR BLD: 1.03 RATIO — SIGNIFICANT CHANGE UP (ref 0.88–1.16)
KETONES UR-MCNC: ABNORMAL
LACTATE SERPL-SCNC: 0.8 MMOL/L — SIGNIFICANT CHANGE UP (ref 0.7–2)
LEUKOCYTE ESTERASE UR-ACNC: NEGATIVE — SIGNIFICANT CHANGE UP
LIDOCAIN IGE QN: 78 U/L — SIGNIFICANT CHANGE UP (ref 73–393)
LYMPHOCYTES # BLD AUTO: 1.17 K/UL — SIGNIFICANT CHANGE UP (ref 1–3.3)
LYMPHOCYTES # BLD AUTO: 25.1 % — SIGNIFICANT CHANGE UP (ref 13–44)
MCHC RBC-ENTMCNC: 30.5 PG — SIGNIFICANT CHANGE UP (ref 27–34)
MCHC RBC-ENTMCNC: 33.3 GM/DL — SIGNIFICANT CHANGE UP (ref 32–36)
MCV RBC AUTO: 91.7 FL — SIGNIFICANT CHANGE UP (ref 80–100)
MONOCYTES # BLD AUTO: 0.39 K/UL — SIGNIFICANT CHANGE UP (ref 0–0.9)
MONOCYTES NFR BLD AUTO: 8.4 % — SIGNIFICANT CHANGE UP (ref 2–14)
NEUTROPHILS # BLD AUTO: 2.88 K/UL — SIGNIFICANT CHANGE UP (ref 1.8–7.4)
NEUTROPHILS NFR BLD AUTO: 61.5 % — SIGNIFICANT CHANGE UP (ref 43–77)
NITRITE UR-MCNC: NEGATIVE — SIGNIFICANT CHANGE UP
NRBC # BLD: 0 /100 WBCS — SIGNIFICANT CHANGE UP (ref 0–0)
PH UR: 5 — SIGNIFICANT CHANGE UP (ref 5–8)
PLATELET # BLD AUTO: 227 K/UL — SIGNIFICANT CHANGE UP (ref 150–400)
POTASSIUM SERPL-MCNC: 4.7 MMOL/L — SIGNIFICANT CHANGE UP (ref 3.5–5.3)
POTASSIUM SERPL-SCNC: 4.7 MMOL/L — SIGNIFICANT CHANGE UP (ref 3.5–5.3)
PROT SERPL-MCNC: 7.4 G/DL — SIGNIFICANT CHANGE UP (ref 6–8.3)
PROT UR-MCNC: NEGATIVE — SIGNIFICANT CHANGE UP
PROTHROM AB SERPL-ACNC: 12 SEC — SIGNIFICANT CHANGE UP (ref 10.6–13.6)
RBC # BLD: 4.59 M/UL — SIGNIFICANT CHANGE UP (ref 3.8–5.2)
RBC # FLD: 12.6 % — SIGNIFICANT CHANGE UP (ref 10.3–14.5)
SARS-COV-2 RNA SPEC QL NAA+PROBE: SIGNIFICANT CHANGE UP
SODIUM SERPL-SCNC: 140 MMOL/L — SIGNIFICANT CHANGE UP (ref 135–145)
SP GR SPEC: 1.02 — SIGNIFICANT CHANGE UP (ref 1.01–1.02)
UROBILINOGEN FLD QL: NEGATIVE — SIGNIFICANT CHANGE UP
WBC # BLD: 4.67 K/UL — SIGNIFICANT CHANGE UP (ref 3.8–10.5)
WBC # FLD AUTO: 4.67 K/UL — SIGNIFICANT CHANGE UP (ref 3.8–10.5)

## 2021-06-12 PROCEDURE — 44005 FREEING OF BOWEL ADHESION: CPT | Mod: AS

## 2021-06-12 PROCEDURE — 44005 FREEING OF BOWEL ADHESION: CPT

## 2021-06-12 PROCEDURE — 99284 EMERGENCY DEPT VISIT MOD MDM: CPT

## 2021-06-12 PROCEDURE — 99223 1ST HOSP IP/OBS HIGH 75: CPT | Mod: 25

## 2021-06-12 PROCEDURE — 99223 1ST HOSP IP/OBS HIGH 75: CPT | Mod: 57

## 2021-06-12 PROCEDURE — 99222 1ST HOSP IP/OBS MODERATE 55: CPT

## 2021-06-12 PROCEDURE — 99406 BEHAV CHNG SMOKING 3-10 MIN: CPT

## 2021-06-12 PROCEDURE — 93010 ELECTROCARDIOGRAM REPORT: CPT

## 2021-06-12 RX ORDER — SODIUM CHLORIDE 9 MG/ML
1000 INJECTION INTRAMUSCULAR; INTRAVENOUS; SUBCUTANEOUS ONCE
Refills: 0 | Status: COMPLETED | OUTPATIENT
Start: 2021-06-12 | End: 2021-06-12

## 2021-06-12 RX ORDER — CHOLECALCIFEROL (VITAMIN D3) 125 MCG
2 CAPSULE ORAL
Qty: 0 | Refills: 0 | DISCHARGE

## 2021-06-12 RX ORDER — BENZOCAINE AND MENTHOL 5; 1 G/100ML; G/100ML
1 LIQUID ORAL
Refills: 0 | Status: DISCONTINUED | OUTPATIENT
Start: 2021-06-12 | End: 2021-06-15

## 2021-06-12 RX ORDER — SODIUM CHLORIDE 9 MG/ML
1000 INJECTION, SOLUTION INTRAVENOUS
Refills: 0 | Status: DISCONTINUED | OUTPATIENT
Start: 2021-06-12 | End: 2021-06-12

## 2021-06-12 RX ORDER — ACETAMINOPHEN 500 MG
1000 TABLET ORAL ONCE
Refills: 0 | Status: COMPLETED | OUTPATIENT
Start: 2021-06-13 | End: 2021-06-13

## 2021-06-12 RX ORDER — METOPROLOL TARTRATE 50 MG
1 TABLET ORAL
Qty: 0 | Refills: 0 | DISCHARGE

## 2021-06-12 RX ORDER — ROSUVASTATIN CALCIUM 5 MG/1
1 TABLET ORAL
Qty: 0 | Refills: 0 | DISCHARGE

## 2021-06-12 RX ORDER — HYDROMORPHONE HYDROCHLORIDE 2 MG/ML
1 INJECTION INTRAMUSCULAR; INTRAVENOUS; SUBCUTANEOUS EVERY 4 HOURS
Refills: 0 | Status: DISCONTINUED | OUTPATIENT
Start: 2021-06-12 | End: 2021-06-13

## 2021-06-12 RX ORDER — HYDROMORPHONE HYDROCHLORIDE 2 MG/ML
0.5 INJECTION INTRAMUSCULAR; INTRAVENOUS; SUBCUTANEOUS EVERY 4 HOURS
Refills: 0 | Status: DISCONTINUED | OUTPATIENT
Start: 2021-06-12 | End: 2021-06-13

## 2021-06-12 RX ORDER — HYDROMORPHONE HYDROCHLORIDE 2 MG/ML
0.5 INJECTION INTRAMUSCULAR; INTRAVENOUS; SUBCUTANEOUS
Refills: 0 | Status: DISCONTINUED | OUTPATIENT
Start: 2021-06-12 | End: 2021-06-12

## 2021-06-12 RX ORDER — CEFOTETAN DISODIUM 1 G
2 VIAL (EA) INJECTION ONCE
Refills: 0 | Status: COMPLETED | OUTPATIENT
Start: 2021-06-12 | End: 2021-06-12

## 2021-06-12 RX ORDER — CHOLECALCIFEROL (VITAMIN D3) 125 MCG
1 CAPSULE ORAL
Qty: 0 | Refills: 0 | DISCHARGE

## 2021-06-12 RX ORDER — METOPROLOL TARTRATE 50 MG
2.5 TABLET ORAL EVERY 12 HOURS
Refills: 0 | Status: DISCONTINUED | OUTPATIENT
Start: 2021-06-12 | End: 2021-06-15

## 2021-06-12 RX ORDER — ONDANSETRON 8 MG/1
4 TABLET, FILM COATED ORAL ONCE
Refills: 0 | Status: DISCONTINUED | OUTPATIENT
Start: 2021-06-12 | End: 2021-06-12

## 2021-06-12 RX ORDER — SODIUM CHLORIDE 9 MG/ML
1000 INJECTION, SOLUTION INTRAVENOUS
Refills: 0 | Status: DISCONTINUED | OUTPATIENT
Start: 2021-06-12 | End: 2021-06-13

## 2021-06-12 RX ORDER — ACETAMINOPHEN 500 MG
1000 TABLET ORAL ONCE
Refills: 0 | Status: COMPLETED | OUTPATIENT
Start: 2021-06-12 | End: 2021-06-12

## 2021-06-12 RX ORDER — ASPIRIN/CALCIUM CARB/MAGNESIUM 324 MG
0 TABLET ORAL
Qty: 0 | Refills: 0 | DISCHARGE

## 2021-06-12 RX ORDER — HEPARIN SODIUM 5000 [USP'U]/ML
5000 INJECTION INTRAVENOUS; SUBCUTANEOUS EVERY 12 HOURS
Refills: 0 | Status: DISCONTINUED | OUTPATIENT
Start: 2021-06-12 | End: 2021-06-12

## 2021-06-12 RX ORDER — SODIUM CHLORIDE 9 MG/ML
1000 INJECTION INTRAMUSCULAR; INTRAVENOUS; SUBCUTANEOUS
Refills: 0 | Status: DISCONTINUED | OUTPATIENT
Start: 2021-06-12 | End: 2021-06-12

## 2021-06-12 RX ORDER — HEPARIN SODIUM 5000 [USP'U]/ML
5000 INJECTION INTRAVENOUS; SUBCUTANEOUS EVERY 12 HOURS
Refills: 0 | Status: DISCONTINUED | OUTPATIENT
Start: 2021-06-13 | End: 2021-06-16

## 2021-06-12 RX ADMIN — BENZOCAINE AND MENTHOL 1 LOZENGE: 5; 1 LIQUID ORAL at 23:10

## 2021-06-12 RX ADMIN — Medication 100 GRAM(S): at 23:26

## 2021-06-12 RX ADMIN — SODIUM CHLORIDE 90 MILLILITER(S): 9 INJECTION INTRAMUSCULAR; INTRAVENOUS; SUBCUTANEOUS at 16:29

## 2021-06-12 RX ADMIN — SODIUM CHLORIDE 75 MILLILITER(S): 9 INJECTION, SOLUTION INTRAVENOUS at 13:55

## 2021-06-12 RX ADMIN — SODIUM CHLORIDE 100 MILLILITER(S): 9 INJECTION, SOLUTION INTRAVENOUS at 19:01

## 2021-06-12 RX ADMIN — Medication 400 MILLIGRAM(S): at 20:22

## 2021-06-12 RX ADMIN — SODIUM CHLORIDE 1000 MILLILITER(S): 9 INJECTION INTRAMUSCULAR; INTRAVENOUS; SUBCUTANEOUS at 10:39

## 2021-06-12 RX ADMIN — Medication 2.5 MILLIGRAM(S): at 18:59

## 2021-06-12 RX ADMIN — SODIUM CHLORIDE 1000 MILLILITER(S): 9 INJECTION INTRAMUSCULAR; INTRAVENOUS; SUBCUTANEOUS at 08:39

## 2021-06-12 RX ADMIN — BENZOCAINE AND MENTHOL 1 LOZENGE: 5; 1 LIQUID ORAL at 20:05

## 2021-06-12 RX ADMIN — Medication 1000 MILLIGRAM(S): at 20:30

## 2021-06-12 NOTE — CONSULT NOTE ADULT - SUBJECTIVE AND OBJECTIVE BOX
CHIEF COMPLAINT: Patient is a 71y old  Female who presents with a chief complaint of SBO (2021 10:38)      HPI: 71 F w hx of SBO, PSVT with multiple AV pathways, repeated failed ablations, s/p AVJ ablation, s/p PPM with lead fracture in  s/p extraction, recent MDT gen change in 2020 presents with abd pain. Denies any chest pain, dyspnea, palpitations, PND, orthopnea, near syncope, syncope, lower extremity edema, stroke like symptoms. Appears to have an SBO requiring surgical intervention. Able to climb 2 flights of stairs with no symptoms.       EKG: SR     REVIEW OF SYSTEMS:   All other review of systems are negative unless indicated above    PAST MEDICAL & SURGICAL HISTORY:  Personal History of Mitral Valve Disease  mitral valve prolapse    s/p AV (Atrioventricular) Roseann Ablation    SVT (supraventricular tachycardia)    SBO (small bowel obstruction)    Cardiac Pacemaker  MDT dual chamber PPM , lead fx w/ system replacement     H/O  section  x1    S/P cataract surgery  Right        SOCIAL HISTORY:  No tobacco, ethanol, or drug abuse.    FAMILY HISTORY:    No family history of acute MI or sudden cardiac death.    MEDICATIONS  (STANDING):  cefoTEtan  IVPB 2 Gram(s) IV Intermittent Once  sodium chloride 0.9%. 1000 milliLiter(s) (75 mL/Hr) IV Continuous <Continuous>    MEDICATIONS  (PRN):      Allergies    tetracycline (Hives)    Intolerances        Home meds:  Home Medications:  aspirin 81 mg oral tablet: orally once a day (2021 11:21)  Calcium 600+D oral tablet: 2 tab(s) orally once a day (2021 11:21)  rosuvastatin 10 mg oral tablet: 1 tab(s) orally once a day (2021 11:21)  Toprol-XL 25 mg oral tablet, extended release: 1 tab(s) orally once a day (2021 11:21)  Vitamin D3 5000 intl units (125 mcg) oral capsule: 2 tab(s) orally once a day (2021 11:21)        VITAL SIGNS:   Vital Signs Last 24 Hrs  T(C): 36.7 (2021 08:08), Max: 36.7 (2021 08:08)  T(F): 98.1 (2021 08:08), Max: 98.1 (2021 08:08)  HR: 75 (2021 11:22) (75 - 80)  BP: 150/76 (2021 11:22) (148/69 - 150/76)  BP(mean): --  RR: 18 (2021 11:22) (18 - 18)  SpO2: 99% (2021 11:22) (95% - 99%)    I&O's Summary      On Exam:     Constitutional: NAD, awake and alert, well-developed  HEENT: Moist Mucous Membranes, Anicteric  Pulmonary: Non-labored, breath sounds are clear bilaterally, No wheezing, rales or rhonchi  Cardiovascular: Regular, S1 and S2, No murmurs, rubs, gallops or clicks  Gastrointestinal: Bowel Sounds present, soft, tender mildly distended.   Lymph: No peripheral edema. No lymphadenopathy.  Skin: No visible rashes or ulcers.  Psych:  Mood & affect appropriate    LABS: All Labs Reviewed:                        14.0   4.67  )-----------( 227      ( 2021 09:02 )             42.1     2021 09:02    140    |  108    |  20     ----------------------------<  97     4.7     |  24     |  0.87     Ca    8.9        2021 09:02    TPro  7.4    /  Alb  3.8    /  TBili  0.4    /  DBili  x      /  AST  17     /  ALT  8      /  AlkPhos  80     2021 09:02    PT/INR - ( 2021 09:02 )   PT: 12.0 sec;   INR: 1.03 ratio         PTT - ( 2021 09:02 )  PTT:30.9 sec      Blood Culture:         RADIOLOGY:  < from: Transthoracic Echocardiogram w/ Doppler (05.12.10 @ 14:50) >    PATIENT: RAMÓN LOUISE  : 1950   Age: 60 (F)   MR#: [id number]  STUDY DATE: 2010  LOCATION: [address] [place] [address]     Tape: Digital  SONOGRAPHER: Debbi Barton RDCS  STUDY QUALITY: Technically fair  REF. PHYSICIAN(S): YORDY Thomson MD  ------------------------------------------------------------------------  Procedure: Transthoracic echocardiogram with complete 2-D,  M-Mode and Doppler examination.  Diagnosis: Arrhythmia  ------------------------------------------------------------------------  Dimensions:    Normal Values:  LA:     3.1    2.0 - 4.0 cm  Ao:     2.9    2.0 - 3.8 cm  SEPTUM: 0.7    0.6 - 1.2 cm  PWT:    0.7    0.6 - 1.1 cm  LVIDd:  5.0    3.0 - 5.6 cm  LVIDs:  3.4    1.8 - 4.0 cm  Derived variables:  LVMI: 70 g/m2  RWT: 0.28  Fractional short: 32 %  Ejection Fraction: 60 %  ------------------------------------------------------------------------  Observations:  Mitral Valve: Mitral annular calcification, otherwise  normal mitral valve.  Aortic Valve/Aorta: Normal trileaflet aortic valve.  Normal aortic root.  Left Atrium: Normal left atrium.  Left Ventricle: Normal left ventricular internal dimensions  and wall thicknesses.  Normal left ventricular systolic function. Mild diastolic  dysfunction (Stage I).  Right Heart: Normal right atrium.  Normal right ventricular size and systolic function.  Normal tricuspid and pulmonic valves.  Pericardium/Pleura: Normal pericardium with no pericardial  effusion.  Doppler:Mild mitral regurgitation.  Mild tricuspid regurgitation. Estimated pulmonary artery  systolic pressure equals 34 mm Hg, assuming right atrial  pressure equals 10  mm Hg.  ------------------------------------------------------------------------  Conclusions:  1. Mild mitral regurgitation.  2. Normal left ventricular systolic function. Mild  diastolic dysfunction (Stage I).  3. Normal right ventricular size and systolic function.  4. Mild tricuspid regurgitation. Estimated pulmonary artery  systolic pressure equals 34 mm Hg, assuming right atrial  pressure equals 10  mm Hg.  ------------------------------------------------------------------------  Confirmed on  2010 - 17:25:48 by Mike Hernandez M.D.  ------------------------------------------------------------------------    < end of copied text >  < from: CT Abdomen and Pelvis w/ Oral Cont and w/ IV Cont (21 @ 13:29) >    EXAM:  CT ABDOMEN AND PELVIS OC IC        PROCEDURE DATE:  2021           INTERPRETATION:  CLINICAL INFORMATION: Left-sided abdominal pain. Surgical history of .    COMPARISON: None.    CONTRAST/COMPLICATIONS:  IV Contrast: [id number]  90 cc administered   10 cc discarded  Oral Contrast: Omnipaque 300  Complications: None reported at time of study completion    PROCEDURE:  CT of the Abdomen and Pelvis was performed.  Sagittal and coronal reformats were performed.    FINDINGS:  LOWER CHEST: Within normal limits. Cardiac pacer wire.    LIVER: Within normal limits.  BILE DUCTS: Normal caliber.  GALLBLADDER: Within normal limits.  SPLEEN: Within normal limits.  PANCREAS: Within normal limits.  ADRENALS: Within normal limits.  KIDNEYS/URETERS: Small bilateral renal cysts. Otherwise, within normal limits.    BLADDER: Within normal limits.  REPRODUCTIVE ORGANS: Uterus and adnexa within normal limits.    BOWEL: There is mild to moderate dilatation of mid small bowel loops to 3.5 cm extending to a whorled appearance of the mesentery and bowel, best visualized on images 86 through 95 of series 3, with abrupt luminal narrowing in the midline anterior pelvis on image 92. There is mild concentric wall thickening of mildly dilated small bowel loops distal to the transition which extend into the right pelvis, with a fanlike appearance of the feeding mesentery with mild infiltration, best visualized on image 93 and a second transition to normal caliber distal small bowel alongthe anterior peritoneal reflection on image 92. There is transit of oral contrast to the colon. This constellation of findings is compatible with an adhesive band with a low-grade closed loop obstruction. There is no evidence of pneumatosis or extraluminal gas.    Remaining small and large bowel loops are unremarkable with no evidence of obstruction, bowel wall thickening, or inflammatory stranding of the adjacent mesenteric fat. The minimally distended stomach is unremarkable. The retrocecal appendix is unremarkable.  PERITONEUM: No ascites.  VESSELS: Within normal limits.  RETROPERITONEUM/LYMPH NODES: No lymphadenopathy.  ABDOMINAL WALL: Within normal limits.  BONES: Within normal limits.    IMPRESSION: Low-grade closed loop obstruction of the mid to distal small bowel in the midline anterior pelvis, likely related to postoperative adhesion. Recommend surgical consultation. Results discussed with Dr. Garcia at time of interpretation.               EUGENIA HAND M.D., ATTENDING RADIOLOGIST  This document has been electronically signed. 2021  2:03PM    < end of copied text >

## 2021-06-12 NOTE — DISCHARGE NOTE PROVIDER - NSDCMRMEDTOKEN_GEN_ALL_CORE_FT
aspirin 81 mg oral tablet: orally once a day  Calcium 600+D oral tablet: 2 tab(s) orally once a day  rosuvastatin 10 mg oral tablet: 1 tab(s) orally once a day  Toprol-XL 25 mg oral tablet, extended release: 1 tab(s) orally once a day  Vitamin D3 5000 intl units (125 mcg) oral capsule: 1 tab(s) orally once a day   aspirin 81 mg oral tablet: orally once a day  Calcium 600+D oral tablet: 2 tab(s) orally once a day  Motrin  mg oral capsule: 1-2 cap(s) orally every 6-8 hours as needed for moderate to severe pain  rosuvastatin 10 mg oral tablet: 1 tab(s) orally once a day  Toprol-XL 25 mg oral tablet, extended release: 1 tab(s) orally once a day  Tylenol 325 mg oral tablet: 2 tab(s) orally every 4 hours as needed for mild pain  Vitamin D3 5000 intl units (125 mcg) oral capsule: 1 tab(s) orally once a day

## 2021-06-12 NOTE — H&P ADULT - NSICDXPASTSURGICALHX_GEN_ALL_CORE_FT
PAST SURGICAL HISTORY:  Cardiac Pacemaker MDT dual chamber PPM , lead fx w/ system replacement     H/O  section x1    S/P cataract surgery Right

## 2021-06-12 NOTE — CONSULT NOTE ADULT - PROBLEM SELECTOR RECOMMENDATION 2
pt is considered an intermediate candidate for a low to intermediate risk procedure, understands risks vs benefits, apprec cardio optimization Dr KEN Muñoz, pt is medically optimized at this time chronic, stable. s/p ablation and ppm  cont metoprolol with hold parameters and asa 81mg  monitor on tele  apprec cardio collaboration in management

## 2021-06-12 NOTE — CONSULT NOTE ADULT - PROBLEM SELECTOR RECOMMENDATION 3
chronic, stable  cont postop once po  npo, once po dash diet chronic  pt wants to quit  will give 7mg nicotine patch as smokes  6 cigarettes per day  counseled on smoking cessation techniques and benefits approx 8 minutes chronic  pt wants to quit  recommend postop 7mg nicotine patch to be applied as smokes  6 cigarettes per day  counseled on smoking cessation techniques and benefits approx 8 minutes

## 2021-06-12 NOTE — CONSULT NOTE ADULT - SUBJECTIVE AND OBJECTIVE BOX
HPI: 70 yo F with pmh sbo, MVP,       PAST MEDICAL & SURGICAL HISTORY:  Personal History of Mitral Valve Disease  mitral valve prolapse    s/p AV (Atrioventricular) Roseann Ablation    SVT (supraventricular tachycardia)    SBO (small bowel obstruction)    Cardiac Pacemaker  MDT dual chamber PPM , lead fx w/ system replacement     H/O  section  x1    S/P cataract surgery  Right        CONSTITUTIONAL: No weakness, fevers or chills  EYES/ENT: No visual changes;  No vertigo or throat pain   NECK: No pain or stiffness  RESPIRATORY: No cough, wheezing, hemoptysis; No shortness of breath  CARDIOVASCULAR: No chest pain or palpitations  GASTROINTESTINAL: No abdominal or epigastric pain. No nausea, vomiting, or hematemesis; No diarrhea or constipation. No melena or hematochezia.  GENITOURINARY: No dysuria, frequency or hematuria  NEUROLOGICAL: No numbness or weakness  SKIN: No itching, burning, rashes, or lesions   All other review of systems is negative unless indicated above.    MEDICATIONS  (STANDING):  cefoTEtan  IVPB 2 Gram(s) IV Intermittent Once  sodium chloride 0.9%. 1000 milliLiter(s) (75 mL/Hr) IV Continuous <Continuous>    MEDICATIONS  (PRN):      Allergies    tetracycline (Hives)    Intolerances        SOCIAL HISTORY:  Tobacco   Alcohol  Drugs    FAMILY HISTORY:      Vital Signs Last 24 Hrs  T(C): 36.7 (2021 08:08), Max: 36.7 (2021 08:08)  T(F): 98.1 (2021 08:08), Max: 98.1 (2021 08:08)  HR: 75 (2021 11:22) (75 - 80)  BP: 150/76 (2021 11:22) (148/69 - 150/76)  BP(mean): --  RR: 18 (2021 11:22) (18 - 18)  SpO2: 99% (2021 11:22) (95% - 99%)    General: WN/WD NAD  Neurology: A&Ox3, nonfocal, CAMPOS x 4  Respiratory: CTA B/L  CV: RRR, S1S2, no murmurs, rubs or gallops  Abdominal: Soft, NT, ND +BS, Last BM  Extremities: No edema, + peripheral pulses  Incisions:    LABS:                        14.0   4.67  )-----------( 227      ( 2021 09:02 )             42.1     06-12    140  |  108  |  20  ----------------------------<  97  4.7   |  24  |  0.87    Ca    8.9      2021 09:02    TPro  7.4  /  Alb  3.8  /  TBili  0.4  /  DBili  x   /  AST  17  /  ALT  8<L>  /  AlkPhos  80  06-12    PT/INR - ( 2021 09:02 )   PT: 12.0 sec;   INR: 1.03 ratio         PTT - ( 2021 09:02 )  PTT:30.9 sec  Urinalysis Basic - ( 2021 09:14 )    Color: Yellow / Appearance: Clear / S.020 / pH: x  Gluc: x / Ketone: Trace  / Bili: Negative / Urobili: Negative   Blood: x / Protein: Negative / Nitrite: Negative   Leuk Esterase: Negative / RBC: x / WBC x   Sq Epi: x / Non Sq Epi: x / Bacteria: x        RADIOLOGY & ADDITIONAL STUDIES: HPI: 70 yo F with pmh sbo, MVP, PPM, AV roseann ablation , HLD who p/w abdominal pain that had been going on for a ople of months and ct abd iv po contrast as outpatient which showed low grade close loop obstruction,.  Pt was called by LUIS Garcia and told patient to go to ED, Pt had small BM, last meal was eggs ad coffee at 6am. Pt had covid vaccine series completed in 2021.      Outpatient provider:  PMD-Dr Luly Singh/ NICHOLAS Quintana      PAST MEDICAL & SURGICAL HISTORY:  Personal History of Mitral Valve Disease  mitral valve prolapse    s/p AV (Atrioventricular) Roseann Ablation    SVT (supraventricular tachycardia)    SBO (small bowel obstruction)    Cardiac Pacemaker  MDT dual chamber PPM , lead fx w/ system replacement     H/O  section  x1    S/P cataract surgery  Right        CONSTITUTIONAL: No weakness, fevers or chills  EYES/ENT: No visual changes;  No vertigo or throat pain   NECK: No pain or stiffness  RESPIRATORY: No cough, wheezing, hemoptysis; No shortness of breath  CARDIOVASCULAR: No chest pain or palpitations  GASTROINTESTINAL: +abd pain  GENITOURINARY: No dysuria, frequency or hematuria  NEUROLOGICAL: No numbness or weakness  SKIN: No itching, burning, rashes, or lesions   All other review of systems is negative unless indicated above.    MEDICATIONS  (STANDING):  cefoTEtan  IVPB 2 Gram(s) IV Intermittent Once  sodium chloride 0.9%. 1000 milliLiter(s) (75 mL/Hr) IV Continuous <Continuous>    MEDICATIONS  (PRN):      Allergies    tetracycline (Hives)    Intolerances        SOCIAL HISTORY:  Tobacco   Alcohol  Drugs    FAMILY HISTORY:      Vital Signs Last 24 Hrs  T(C): 36.7 (2021 08:08), Max: 36.7 (2021 08:08)  T(F): 98.1 (2021 08:08), Max: 98.1 (2021 08:08)  HR: 75 (2021 11:22) (75 - 80)  BP: 150/76 (2021 11:22) (148/69 - 150/76)  BP(mean): --  RR: 18 (2021 11:22) (18 - 18)  SpO2: 99% (2021 11:22) (95% - 99%)    General: WN/WD NAD  Neurology: A&Ox3, nonfocal, CAMPOS x 4  Respiratory: CTA B/L  CV: RRR, S1S2, no murmurs, rubs or gallops  Abdominal: soft, mild distension, bowel sounds hypoactive, mild tenderness  Extremities: No edema, + peripheral pulses  Incisions:    LABS:                        14.0   4.67  )-----------( 227      ( 2021 09:02 )             42.1     06-12    140  |  108  |  20  ----------------------------<  97  4.7   |  24  |  0.87    Ca    8.9      2021 09:02    TPro  7.4  /  Alb  3.8  /  TBili  0.4  /  DBili  x   /  AST  17  /  ALT  8<L>  /  AlkPhos  80  06-12    PT/INR - ( 2021 09:02 )   PT: 12.0 sec;   INR: 1.03 ratio         PTT - ( 2021 09:02 )  PTT:30.9 sec  Urinalysis Basic - ( 2021 09:14 )    Color: Yellow / Appearance: Clear / S.020 / pH: x  Gluc: x / Ketone: Trace  / Bili: Negative / Urobili: Negative   Blood: x / Protein: Negative / Nitrite: Negative   Leuk Esterase: Negative / RBC: x / WBC x   Sq Epi: x / Non Sq Epi: x / Bacteria: x        RADIOLOGY & ADDITIONAL STUDIES: HPI: 70 yo F with pmh sbo, MVP, PPM, AV roseann ablation , HLD who p/w abdominal pain that had been going on for a couople of months and was seen by GI outpatient on 21 who ordered ct abd iv po contrast as outpatient which showed low grade close loop obstruction,.  Pt was called by GI Dr Garcia and told patient to go to ED, Pt had small BM, last meal was eggs ad coffee at 6am. Pt had covid vaccine series completed in 2021.      Outpatient provider:  PMD-Dr Mauricio  Cardio-Kaiden/ NICHOLAS SMITH-Jose        PAST MEDICAL & SURGICAL HISTORY:  Personal History of Mitral Valve Disease  mitral valve prolapse    s/p AV (Atrioventricular) Roseann Ablation    SVT (supraventricular tachycardia)    SBO (small bowel obstruction)    Cardiac Pacemaker  MDT dual chamber PPM , lead fx w/ system replacement     H/O  section  x1    S/P cataract surgery  Right        CONSTITUTIONAL: No weakness, fevers or chills  EYES/ENT: No visual changes;  No vertigo or throat pain   NECK: No pain or stiffness  RESPIRATORY: No cough, wheezing, hemoptysis; No shortness of breath  CARDIOVASCULAR: No chest pain or palpitations  GASTROINTESTINAL: +abd pain  GENITOURINARY: No dysuria, frequency or hematuria  NEUROLOGICAL: No numbness or weakness  SKIN: No itching, burning, rashes, or lesions   All other review of systems is negative unless indicated above.    MEDICATIONS  (STANDING):  cefoTEtan  IVPB 2 Gram(s) IV Intermittent Once  sodium chloride 0.9%. 1000 milliLiter(s) (75 mL/Hr) IV Continuous <Continuous>    MEDICATIONS  (PRN):      Allergies    tetracycline (Hives)    Intolerances        SOCIAL HISTORY:  pt lives alone in a house  smokes 6 cigarettes a day, interested in quitting this admission, amendable to a nicotine patch  denies etoh use or ilicit drug use  Retired     HCP/Caretakers:  Son Camden Lauren ph#[phone]  daughter Juli Martins ph#[phone]    FAMILY HISTORY:  Father: qudruple bypass, cab age 75  Mother-Dementia      Vital Signs Last 24 Hrs  T(C): 36.7 (2021 08:08), Max: 36.7 (2021 08:08)  T(F): 98.1 (2021 08:08), Max: 98.1 (2021 08:08)  HR: 75 (2021 11:22) (75 - 80)  BP: 150/76 (2021 11:22) (148/69 - 150/76)  BP(mean): --  RR: 18 (2021 11:22) (18 - 18)  SpO2: 99% (2021 11:22) (95% - 99%)    General: WN/WD NAD  Neurology: A&Ox3, nonfocal, CAMPOS x 4  Respiratory: CTA B/L  CV: RRR, S1S2, no murmurs, rubs or gallops  Abdominal: soft, mild distension, bowel sounds hypoactive, mild tenderness  Extremities: No edema, + peripheral pulses  Incisions:    LABS:                        14.0   4.67  )-----------( 227      ( 2021 09:02 )             42.1     06-12    140  |  108  |  20  ----------------------------<  97  4.7   |  24  |  0.87    Ca    8.9      2021 09:02    TPro  7.4  /  Alb  3.8  /  TBili  0.4  /  DBili  x   /  AST  17  /  ALT  8<L>  /  AlkPhos  80  06-12    PT/INR - ( 2021 09:02 )   PT: 12.0 sec;   INR: 1.03 ratio         PTT - ( 2021 09:02 )  PTT:30.9 sec  Urinalysis Basic - ( 2021 09:14 )    Color: Yellow / Appearance: Clear / S.020 / pH: x  Gluc: x / Ketone: Trace  / Bili: Negative / Urobili: Negative   Blood: x / Protein: Negative / Nitrite: Negative   Leuk Esterase: Negative / RBC: x / WBC x   Sq Epi: x / Non Sq Epi: x / Bacteria: x        RADIOLOGY & ADDITIONAL STUDIES:

## 2021-06-12 NOTE — H&P ADULT - NSICDXPASTMEDICALHX_GEN_ALL_CORE_FT
PAST MEDICAL HISTORY:  Personal History of Mitral Valve Disease mitral valve prolapse    s/p AV (Atrioventricular) Roseann Ablation     SBO (small bowel obstruction)     SVT (supraventricular tachycardia)

## 2021-06-12 NOTE — CONSULT NOTE ADULT - PROBLEM SELECTOR RECOMMENDATION 4
chronic, stable. s/p ablation and ppm  cont metoprolol with hold parameters and asa 81mg  monitor on tele  apprec cardio collaboration in management pt is considered an intermediate candidate for a low to intermediate risk procedure, understands risks vs benefits, apprec cardio optimization Dr KEN Muñoz, pt is medically optimized at this time

## 2021-06-12 NOTE — BRIEF OPERATIVE NOTE - OPERATION/FINDINGS
Proximal ileal/distal jejunal adhesions within the mesentery causing twisting/adhesion of multiple loops of SB.  SB intact and viable through its length.  SB peristalsis noted throughout, though some hypoperistalsis at adhesed portions of bowel noted post adhesiolysis.

## 2021-06-12 NOTE — CONSULT NOTE ADULT - ASSESSMENT
71 F w hx of SBO, PSVT with multiple AV pathways, repeated failed ablations, s/p AVJ ablation, s/p PPM with lead fracture in 2010 s/p extraction, recent MDT gen change in 12/2020 presents with abd pain.     - Now pt requiring  diagnostic laparoscopy, possible laparotomy, and possible small bowel resection to relieve her small bowel obstruction  - No signs of significant ischemia or volume overload.   - EKG SR with   - Currently no active cardiac conditions. No signs of ischemia, ADHF, clinical exam not consistent with severe stenotic valvular disease, no unstable arrhythmias noted. Therefore able to proceed with this emergent intermediate GI surgery without any further cardiac workup. Routine hemodynamic monitoring is suggested during the procedure.   - MDT PPM had gen change in 12/2020. No need for interrogation at this time.   - Further cardiac workup will depend on clinical course.   
70 yo F with pmh sbo, MVP, PPM, AV deepa ablation 1997, HLD who p/w abdominal pain that had been going on for a ople of months and ct abd iv po contrast as outpatient which showed low grade close loop obstruction, admitted with SBO requiring urgent surgical intervention

## 2021-06-12 NOTE — DISCHARGE NOTE PROVIDER - NSDCCPCAREPLAN_GEN_ALL_CORE_FT
PRINCIPAL DISCHARGE DIAGNOSIS  Diagnosis: SBO (small bowel obstruction)  Assessment and Plan of Treatment:       SECONDARY DISCHARGE DIAGNOSES  Diagnosis: Nicotine dependence  Assessment and Plan of Treatment:     Diagnosis: Preop examination  Assessment and Plan of Treatment:     Diagnosis: HLD (hyperlipidemia)  Assessment and Plan of Treatment:

## 2021-06-12 NOTE — CONSULT NOTE ADULT - PROBLEM SELECTOR RECOMMENDATION 9
-acute on chronic  -plan as per surgical plan of care  -monitor i/os  -[facility] perioperative care

## 2021-06-12 NOTE — CONSULT NOTE ADULT - TIME BILLING
care coordination, plan of care, discussed with Ben jorgensen surg PA, Dr Gaona cardio care coordination, plan of care, discussed with Ben jorgensen surg PA, Dr Gaona cardio, shabana Hannah at bedside

## 2021-06-12 NOTE — DISCHARGE NOTE PROVIDER - NSDCCPTREATMENT_GEN_ALL_CORE_FT
PRINCIPAL PROCEDURE  Procedure: Diagnostic laparoscopy with exploratory laparotomy  Findings and Treatment: lysis of intermesenteric (intraabdominal) adhesions

## 2021-06-12 NOTE — H&P ADULT - ATTENDING COMMENTS
Patient seen and examined.  The CT scan report and images from yesterday were reviewed and revealed a partial small bowel obstruction with two transitions points and was therefore felt to be compatible with a closed loop obstruction.  Considering these findings and that she is significantly tender, I have recommended a diagnostic laparoscopy, possible laparotomy, and possible small bowel resection to relieve her small bowel obstruction.  The risks of surgery, including but not limited to, bleeding, infection, damage to surrounding structures (including enterotomy), conversion to open surgery, need for bowel resection, and hernia formation, were discussed with the patient who understands these risks and consents to the planned surgery.  In addition, the risks associated with small bowel resection, including anastomotic leak, were discussed.  All questions were answered.

## 2021-06-12 NOTE — H&P ADULT - NSHPLABSRESULTS_GEN_ALL_CORE
14.0   4.67  )-----------( 227      ( 2021 09:02 )             42.1       140  |  108  |  20  ----------------------------<  97  4.7   |  24  |  0.87    Ca    8.9      2021 09:02    TPro  7.4  /  Alb  3.8  /  TBili  0.4  /  DBili  x   /  AST  17  /  ALT  8<L>  /  AlkPhos  80        EXAM:  CT ABDOMEN AND PELVIS OC IC        PROCEDURE DATE:  2021           INTERPRETATION:  CLINICAL INFORMATION: Left-sided abdominal pain. Surgical history of .    COMPARISON: None.    CONTRAST/COMPLICATIONS:  IV Contrast: [id number]  90 cc administered   10 cc discarded  Oral Contrast: Omnipaque 300  Complications: None reported at time of study completion    PROCEDURE:  CT of the Abdomen and Pelvis was performed.  Sagittal and coronal reformats were performed.    FINDINGS:  LOWER CHEST: Within normal limits. Cardiac pacer wire.    LIVER: Within normal limits.  BILE DUCTS: Normal caliber.  GALLBLADDER: Within normal limits.  SPLEEN: Within normal limits.  PANCREAS: Within normal limits.  ADRENALS: Within normal limits.  KIDNEYS/URETERS: Small bilateral renal cysts. Otherwise, within normal limits.    BLADDER: Within normal limits.  REPRODUCTIVE ORGANS: Uterus and adnexa within normal limits.    BOWEL: There is mild to moderate dilatation of mid small bowel loops to 3.5 cm extending to a whorled appearance of the mesentery and bowel, best visualized on images 86 through 95 of series 3, with abrupt luminal narrowing in the midline anterior pelvis on image 92. There is mild concentric wall thickening of mildly dilated small bowel loops distal to the transition which extend into the right pelvis, with a fanlike appearance of the feeding mesentery with mild infiltration, best visualized on image 93 and a second transition to normal caliber distal small bowel alongthe anterior peritoneal reflection on image 92. There is transit of oral contrast to the colon. This constellation of findings is compatible with an adhesive band with a low-grade closed loop obstruction. There is no evidence of pneumatosis or extraluminal gas.    Remaining small and large bowel loops are unremarkable with no evidence of obstruction, bowel wall thickening, or inflammatory stranding of the adjacent mesenteric fat. The minimally distended stomach is unremarkable. The retrocecal appendix is unremarkable.  PERITONEUM: No ascites.  VESSELS: Within normal limits.  RETROPERITONEUM/LYMPH NODES: No lymphadenopathy.  ABDOMINAL WALL: Within normal limits.  BONES: Within normal limits.    IMPRESSION: Low-grade closed loop obstruction of the mid to distal small bowel in the midline anterior pelvis, likely related to postoperative adhesion. Recommend surgical consultation. Results discussed with Dr. Garcia at time of interpretation.               EUGENIA HAND M.D., ATTENDING RADIOLOGIST  This document has been electronically signed. 2021  2:03PM

## 2021-06-12 NOTE — PROGRESS NOTE ADULT - SUBJECTIVE AND OBJECTIVE BOX
SURGERY PA NOTE ON BEHALF OF DR. BARRAGAN / GENERAL SURGERY:    S: Patient seen and examined at bedside.  Reports she is comfortable, without abdominal pain.  Resting comfortably supine in bed.  NGT/Cote in place.       MEDICATIONS:  acetaminophen  IVPB .. 1000 milliGRAM(s) IV Intermittent once  cefoTEtan  IVPB 2 Gram(s) IV Intermittent once  heparin   Injectable 5000 Unit(s) SubCutaneous every 12 hours  HYDROmorphone  Injectable 1 milliGRAM(s) IV Push every 4 hours PRN  HYDROmorphone  Injectable 0.5 milliGRAM(s) IV Push every 4 hours PRN  lactated ringers. 1000 milliLiter(s) IV Continuous <Continuous>  metoprolol tartrate Injectable 2.5 milliGRAM(s) IV Push every 12 hours      O:  Vital Signs Last 24 Hrs  T(C): 36.8 (12 Jun 2021 15:27), Max: 37.2 (12 Jun 2021 11:27)  T(F): 98.2 (12 Jun 2021 15:27), Max: 98.9 (12 Jun 2021 11:27)  HR: 76 (12 Jun 2021 15:27) (70 - 80)  BP: 129/78 (12 Jun 2021 15:27) (126/68 - 150/76)  BP(mean): 100 (12 Jun 2021 11:27) (100 - 100)  RR: 17 (12 Jun 2021 15:27) (12 - 18)  SpO2: 95% (12 Jun 2021 15:27) (95% - 100%)    I&O SUMMARY:    06-12-21 @ 07:01  -  06-12-21 @ 18:24  --------------------------------------------------------  IN: 125 mL / OUT: 50 mL / NET: 75 mL        PHYSICAL EXAM:  Lungs: CTA bilat without W/R/R  Card: S1S2  Abd: Softly distended.  Rosmery-incisional tenderness noted. +BS x 4.  No rebound/guarding.  NGT with bilious output, approx. 50cc in cannister.    Ext: Calves soft, NT, without edema bilat    LABS:                        14.0   4.67  )-----------( 227      ( 12 Jun 2021 09:02 )             42.1     06-12    140  |  108  |  20  ----------------------------<  97  4.7   |  24  |  0.87    Ca    8.9      12 Jun 2021 09:02    TPro  7.4  /  Alb  3.8  /  TBili  0.4  /  DBili  x   /  AST  17  /  ALT  8<L>  /  AlkPhos  80  06-12    PT/INR - ( 12 Jun 2021 09:02 )   PT: 12.0 sec;   INR: 1.03 ratio         PTT - ( 12 Jun 2021 09:02 )  PTT:30.9 sec      A:  POD#0, s/p diagnostic laparoscopy with conversion to mini-open laparotomy/lysis of intermesenteric adhesions for closed-loop SBO    P:  Patient currently comfortable, doing well post-operatively  Maintain NPO/NGT/Cote urine catheter  Monitor I's & O's  Continue DVT prophylaxis, incentive spirometry, pain control  Serial abdominal exams  Will follow

## 2021-06-12 NOTE — DISCHARGE NOTE PROVIDER - CARE PROVIDERS DIRECT ADDRESSES
[email or web address]

## 2021-06-12 NOTE — DISCHARGE NOTE PROVIDER - CARE PROVIDER_API CALL
Daren Israel  General Surgery  [address] [place] [address]  Phone: [phone]  Fax: [phone]  Follow Up Time:

## 2021-06-12 NOTE — BRIEF OPERATIVE NOTE - NSICDXBRIEFPROCEDURE_GEN_ALL_CORE_FT
PROCEDURES:  Diagnostic laparoscopy with exploratory laparotomy 12-Jun-2021 14:10:56 lysis of intermesenteric (intraabdominal) adhesions Ben Irene

## 2021-06-12 NOTE — H&P ADULT - HISTORY OF PRESENT ILLNESS
This is a pleasant, 71-yo female with pmhx of HLD, s/p AV node ablation with pacemaker placement, MVP, SVT who presents today with c/o left and mid lower abdominal/pelvic pain intermittently for the past few days.  Patient describes random exacerbations/remissions without identifiable aggravating/mitigating factors.  When felt, pain is sharp, described as a "twisting" sensation, felt in the LLQ and mid-abdominal/pelvic region, and rates 8/10 at its worst.  Currently denies f/c/n/v, and reports last BM was yesterday (described as loose).  Last meal was earlier this morning (small amount of eggs and coffee at around 0600 hours) - denies vomiting afterwards.  Patient has had these symptoms for the last few days - saw Dr. Garcia outpatient 3 days ago, had CT scan performed at [place] yesterday which showed a low-grade closed-loop small bowel obstruction.  Currently denies markie abdominal pain, denies CP/SOB/DURAN/palpitations/dizziness/lightheadedness.  Has received her COVID vaccine ([employer], 3/5 and 3/29).  Denies recent travel or sick contacts.

## 2021-06-12 NOTE — ED PROVIDER NOTE - QUALITY
usually self limited, with worse symptom today.  possibly due to uremia vs gastroparesis.   no concerning GI symptoms on exam.   CT a/p unremarkable.   clinical monitor.   zofran prn.   outpatient gastric emptying study. aching

## 2021-06-12 NOTE — H&P ADULT - NSHPSOCIALHISTORY_GEN_ALL_CORE
+smoker (1/2 PPD x 25y)  Denies ETOH or illicit drug use  Lives alone, performs ADL's independently

## 2021-06-12 NOTE — ED PROVIDER NOTE - OBJECTIVE STATEMENT
71 female sent to ER with report of small bowel obstruction. Patient states she saw her gastroenterologist on 6/9/21 Dr. Garcia for left sided abdominal pain "for months", no nausea, vomiting or fever. Patient had ct abdomen/pelvis 6/11/21 with iv and po contrast showed low grade closed loop obstruction mid to distal small bowel in midline anterior pelvis. Patient states she ate some egg and sips of coffee 71 female sent to ER with report of small bowel obstruction. Patient states she saw her gastroenterologist on 6/9/21 Dr. Garcia for left sided abdominal pain "for months", no nausea, vomiting or fever. Patient had ct abdomen/pelvis 6/11/21 with iv and po contrast showed low grade closed loop obstruction mid to distal small bowel in midline anterior pelvis. Patient was called last night to go to ER, states she had small loose BM yesterday, ate some egg and sips of coffee today at 6am.

## 2021-06-12 NOTE — ED ADULT NURSE NOTE - PMH
Personal History of Mitral Valve Disease  mitral valve prolapse  s/p AV (Atrioventricular) Roseann Ablation    SBO (small bowel obstruction)    SVT (supraventricular tachycardia)

## 2021-06-12 NOTE — H&P ADULT - PROBLEM SELECTOR PLAN 1
Admit to Dr. Israel/ surgical service  Recommend surgical exploratory laparotomy with possible bowel resection as indicated  Discussed with patient including risks/benefits/alternatives  Patient had all questions asked and answered, agreed to proceed with surgery  Medicine/Cardiology consulted for clearances and to follow post-op  NPO/IVF, Cefotetan on call to OR

## 2021-06-12 NOTE — DISCHARGE NOTE PROVIDER - HOSPITAL COURSE
This is a pleasant, 71-yo female with pmhx of HLD, s/p AV node ablation with pacemaker placement, MVP, SVT who presents today with c/o left and mid lower abdominal/pelvic pain intermittently for the past few days.  Patient describes random exacerbations/remissions without identifiable aggravating/mitigating factors.  When felt, pain is sharp, described as a "twisting" sensation, felt in the LLQ and mid-abdominal/pelvic region, and rates 8/10 at its worst.  Currently denies f/c/n/v, and reports last BM was yesterday (described as loose).  Last meal was earlier this morning (small amount of eggs and coffee at around 0600 hours) - denies vomiting afterwards.  Patient has had these symptoms for the last few days - saw Dr. Garcia outpatient 3 days ago, had CT scan performed at [facility] yesterday which showed a low-grade closed-loop small bowel obstruction.  Currently denies markie abdominal pain, denies CP/SOB/DURAN/palpitations/dizziness/lightheadedness.  Has received her COVID vaccine ([employer], 3/5 and 3/29).  Denies recent travel or sick contacts. In ER CT revealed Low-grade closed loop obstruction of the mid to distal small bowel in the midline anterior pelvis, likely related to postoperative adhesion. Pt was made NPO and started on IVF, pt booked for OR. Medicine and cardio consulted prior to OR.    Hospital course:    Patient underwent successful diagnostic lap converted to ex lap with JOYCE without complications, was sent to PACU then to the floor for monitoring.      Over then next few days patient was given pain control. Diet was advanced appropriately until return of normal GI function was obtained as evidence by passing of flatus and BM in addition to toleration of diet. Lab values were monitored.    Disposition: Patient to follow-up with Dr. Israel as outpatient in 1 week.           This is a pleasant, 71-yo female with pmhx of HLD, s/p AV node ablation with pacemaker placement, MVP, SVT who presents today with c/o left and mid lower abdominal/pelvic pain intermittently for the past few days.  Patient describes random exacerbations/remissions without identifiable aggravating/mitigating factors.  When felt, pain is sharp, described as a "twisting" sensation, felt in the LLQ and mid-abdominal/pelvic region, and rates 8/10 at its worst.  Currently denies f/c/n/v, and reports last BM was yesterday (described as loose).  Last meal was earlier this morning (small amount of eggs and coffee at around 0600 hours) - denies vomiting afterwards.  Patient has had these symptoms for the last few days - saw Dr. Garcia outpatient 3 days ago, had CT scan performed at [place] yesterday which showed a low-grade closed-loop small bowel obstruction.  Currently denies markie abdominal pain, denies CP/SOB/DURAN/palpitations/dizziness/lightheadedness.  Has received her COVID vaccine ([employer], 3/5 and 3/29).  Denies recent travel or sick contacts. In ER CT revealed Low-grade closed loop obstruction of the mid to distal small bowel in the midline anterior pelvis, likely related to postoperative adhesion. Pt was made NPO and started on IVF, pt booked for OR. Medicine and cardio consulted prior to OR.    Hospital course:    Patient underwent successful diagnostic lap converted to ex lap with JOYCE without complications, was sent to PACU then to the floor for monitoring.      Over then next few days patient was given pain control. Diet was advanced appropriately until return of normal GI function was obtained as evidence by passing of flatus and BM in addition to toleration of diet. Lab values were monitored.    Disposition: Patient to follow-up with Dr. Israel as outpatient in 2 weeks

## 2021-06-13 LAB
ANION GAP SERPL CALC-SCNC: 7 MMOL/L — SIGNIFICANT CHANGE UP (ref 5–17)
BASOPHILS # BLD AUTO: 0.02 K/UL — SIGNIFICANT CHANGE UP (ref 0–0.2)
BASOPHILS NFR BLD AUTO: 0.2 % — SIGNIFICANT CHANGE UP (ref 0–2)
BUN SERPL-MCNC: 24 MG/DL — HIGH (ref 7–23)
CALCIUM SERPL-MCNC: 8.5 MG/DL — SIGNIFICANT CHANGE UP (ref 8.5–10.1)
CHLORIDE SERPL-SCNC: 105 MMOL/L — SIGNIFICANT CHANGE UP (ref 96–108)
CO2 SERPL-SCNC: 29 MMOL/L — SIGNIFICANT CHANGE UP (ref 22–31)
COVID-19 SPIKE DOMAIN AB INTERP: POSITIVE
COVID-19 SPIKE DOMAIN ANTIBODY RESULT: >250 U/ML — HIGH
CREAT SERPL-MCNC: 1 MG/DL — SIGNIFICANT CHANGE UP (ref 0.5–1.3)
EOSINOPHIL # BLD AUTO: 0 K/UL — SIGNIFICANT CHANGE UP (ref 0–0.5)
EOSINOPHIL NFR BLD AUTO: 0 % — SIGNIFICANT CHANGE UP (ref 0–6)
GLUCOSE SERPL-MCNC: 101 MG/DL — HIGH (ref 70–99)
HCT VFR BLD CALC: 41.3 % — SIGNIFICANT CHANGE UP (ref 34.5–45)
HCV AB S/CO SERPL IA: 0.11 S/CO — SIGNIFICANT CHANGE UP (ref 0–0.99)
HCV AB SERPL-IMP: SIGNIFICANT CHANGE UP
HGB BLD-MCNC: 13.7 G/DL — SIGNIFICANT CHANGE UP (ref 11.5–15.5)
IMM GRANULOCYTES NFR BLD AUTO: 0.5 % — SIGNIFICANT CHANGE UP (ref 0–1.5)
LYMPHOCYTES # BLD AUTO: 0.9 K/UL — LOW (ref 1–3.3)
LYMPHOCYTES # BLD AUTO: 10.3 % — LOW (ref 13–44)
MCHC RBC-ENTMCNC: 30.4 PG — SIGNIFICANT CHANGE UP (ref 27–34)
MCHC RBC-ENTMCNC: 33.2 GM/DL — SIGNIFICANT CHANGE UP (ref 32–36)
MCV RBC AUTO: 91.8 FL — SIGNIFICANT CHANGE UP (ref 80–100)
MONOCYTES # BLD AUTO: 0.5 K/UL — SIGNIFICANT CHANGE UP (ref 0–0.9)
MONOCYTES NFR BLD AUTO: 5.7 % — SIGNIFICANT CHANGE UP (ref 2–14)
NEUTROPHILS # BLD AUTO: 7.29 K/UL — SIGNIFICANT CHANGE UP (ref 1.8–7.4)
NEUTROPHILS NFR BLD AUTO: 83.3 % — HIGH (ref 43–77)
NRBC # BLD: 0 /100 WBCS — SIGNIFICANT CHANGE UP (ref 0–0)
PLATELET # BLD AUTO: 214 K/UL — SIGNIFICANT CHANGE UP (ref 150–400)
POTASSIUM SERPL-MCNC: 4.3 MMOL/L — SIGNIFICANT CHANGE UP (ref 3.5–5.3)
POTASSIUM SERPL-SCNC: 4.3 MMOL/L — SIGNIFICANT CHANGE UP (ref 3.5–5.3)
RBC # BLD: 4.5 M/UL — SIGNIFICANT CHANGE UP (ref 3.8–5.2)
RBC # FLD: 12.5 % — SIGNIFICANT CHANGE UP (ref 10.3–14.5)
SARS-COV-2 IGG+IGM SERPL QL IA: >250 U/ML — HIGH
SARS-COV-2 IGG+IGM SERPL QL IA: POSITIVE
SODIUM SERPL-SCNC: 141 MMOL/L — SIGNIFICANT CHANGE UP (ref 135–145)
WBC # BLD: 8.75 K/UL — SIGNIFICANT CHANGE UP (ref 3.8–10.5)
WBC # FLD AUTO: 8.75 K/UL — SIGNIFICANT CHANGE UP (ref 3.8–10.5)

## 2021-06-13 PROCEDURE — 99232 SBSQ HOSP IP/OBS MODERATE 35: CPT

## 2021-06-13 RX ORDER — ACETAMINOPHEN 500 MG
1000 TABLET ORAL ONCE
Refills: 0 | Status: COMPLETED | OUTPATIENT
Start: 2021-06-13 | End: 2021-06-13

## 2021-06-13 RX ORDER — ONDANSETRON 8 MG/1
4 TABLET, FILM COATED ORAL EVERY 6 HOURS
Refills: 0 | Status: DISCONTINUED | OUTPATIENT
Start: 2021-06-13 | End: 2021-06-14

## 2021-06-13 RX ORDER — DEXTROSE MONOHYDRATE, SODIUM CHLORIDE, AND POTASSIUM CHLORIDE 50; .745; 4.5 G/1000ML; G/1000ML; G/1000ML
1000 INJECTION, SOLUTION INTRAVENOUS
Refills: 0 | Status: DISCONTINUED | OUTPATIENT
Start: 2021-06-13 | End: 2021-06-15

## 2021-06-13 RX ORDER — HYDROMORPHONE HYDROCHLORIDE 2 MG/ML
0.25 INJECTION INTRAMUSCULAR; INTRAVENOUS; SUBCUTANEOUS EVERY 4 HOURS
Refills: 0 | Status: DISCONTINUED | OUTPATIENT
Start: 2021-06-13 | End: 2021-06-15

## 2021-06-13 RX ORDER — BENZOCAINE 10 %
1 GEL (GRAM) MUCOUS MEMBRANE ONCE
Refills: 0 | Status: COMPLETED | OUTPATIENT
Start: 2021-06-13 | End: 2021-06-13

## 2021-06-13 RX ORDER — HYDROMORPHONE HYDROCHLORIDE 2 MG/ML
0.5 INJECTION INTRAMUSCULAR; INTRAVENOUS; SUBCUTANEOUS EVERY 4 HOURS
Refills: 0 | Status: DISCONTINUED | OUTPATIENT
Start: 2021-06-13 | End: 2021-06-15

## 2021-06-13 RX ORDER — PANTOPRAZOLE SODIUM 20 MG/1
40 TABLET, DELAYED RELEASE ORAL DAILY
Refills: 0 | Status: DISCONTINUED | OUTPATIENT
Start: 2021-06-13 | End: 2021-06-15

## 2021-06-13 RX ORDER — ACETAMINOPHEN 500 MG
1000 TABLET ORAL EVERY 6 HOURS
Refills: 0 | Status: DISCONTINUED | OUTPATIENT
Start: 2021-06-14 | End: 2021-06-15

## 2021-06-13 RX ADMIN — HEPARIN SODIUM 5000 UNIT(S): 5000 INJECTION INTRAVENOUS; SUBCUTANEOUS at 17:04

## 2021-06-13 RX ADMIN — PANTOPRAZOLE SODIUM 40 MILLIGRAM(S): 20 TABLET, DELAYED RELEASE ORAL at 11:53

## 2021-06-13 RX ADMIN — Medication 2.5 MILLIGRAM(S): at 17:03

## 2021-06-13 RX ADMIN — HYDROMORPHONE HYDROCHLORIDE 0.5 MILLIGRAM(S): 2 INJECTION INTRAMUSCULAR; INTRAVENOUS; SUBCUTANEOUS at 20:25

## 2021-06-13 RX ADMIN — BENZOCAINE AND MENTHOL 1 LOZENGE: 5; 1 LIQUID ORAL at 11:51

## 2021-06-13 RX ADMIN — Medication 1000 MILLIGRAM(S): at 06:24

## 2021-06-13 RX ADMIN — HYDROMORPHONE HYDROCHLORIDE 0.5 MILLIGRAM(S): 2 INJECTION INTRAMUSCULAR; INTRAVENOUS; SUBCUTANEOUS at 11:51

## 2021-06-13 RX ADMIN — DEXTROSE MONOHYDRATE, SODIUM CHLORIDE, AND POTASSIUM CHLORIDE 100 MILLILITER(S): 50; .745; 4.5 INJECTION, SOLUTION INTRAVENOUS at 14:23

## 2021-06-13 RX ADMIN — Medication 400 MILLIGRAM(S): at 23:30

## 2021-06-13 RX ADMIN — Medication 1000 MILLIGRAM(S): at 23:55

## 2021-06-13 RX ADMIN — Medication 400 MILLIGRAM(S): at 15:15

## 2021-06-13 RX ADMIN — SODIUM CHLORIDE 100 MILLILITER(S): 9 INJECTION, SOLUTION INTRAVENOUS at 06:06

## 2021-06-13 RX ADMIN — HYDROMORPHONE HYDROCHLORIDE 0.5 MILLIGRAM(S): 2 INJECTION INTRAMUSCULAR; INTRAVENOUS; SUBCUTANEOUS at 20:55

## 2021-06-13 RX ADMIN — Medication 400 MILLIGRAM(S): at 00:37

## 2021-06-13 RX ADMIN — HYDROMORPHONE HYDROCHLORIDE 0.5 MILLIGRAM(S): 2 INJECTION INTRAMUSCULAR; INTRAVENOUS; SUBCUTANEOUS at 12:51

## 2021-06-13 RX ADMIN — Medication 1 SPRAY(S): at 08:48

## 2021-06-13 RX ADMIN — Medication 1000 MILLIGRAM(S): at 15:30

## 2021-06-13 RX ADMIN — Medication 2.5 MILLIGRAM(S): at 06:07

## 2021-06-13 RX ADMIN — HEPARIN SODIUM 5000 UNIT(S): 5000 INJECTION INTRAVENOUS; SUBCUTANEOUS at 06:07

## 2021-06-13 RX ADMIN — Medication 400 MILLIGRAM(S): at 06:08

## 2021-06-13 RX ADMIN — Medication 1000 MILLIGRAM(S): at 00:40

## 2021-06-13 NOTE — PROGRESS NOTE ADULT - SUBJECTIVE AND OBJECTIVE BOX
[facility] -- Junie Sellers, Mario Barrow, Toni Pittman [place], [place]  Office # [phone]    Follow Up:  Preop/Postop Optimization    Subjective/Observations: c/o abdominal pain and inability to take deep breaths.  Denies N/V.  Denies chest pain or palpitations.  Tolerating RA.  Ambulated to the BR with no DURAN    REVIEW OF SYSTEMS: All other review of systems is negative unless indicated above  PAST MEDICAL & SURGICAL HISTORY:  Personal History of Mitral Valve Disease  mitral valve prolapse    s/p AV (Atrioventricular) Roseann Ablation    SVT (supraventricular tachycardia)    SBO (small bowel obstruction)    Cardiac Pacemaker  MDT dual chamber PPM , lead fx w/ system replacement     H/O  section  x1    S/P cataract surgery  Right    MEDICATIONS  (STANDING):  acetaminophen  IVPB .. 1000 milliGRAM(s) IV Intermittent once  dextrose 5% + sodium chloride 0.45% with potassium chloride 20 mEq/L 1000 milliLiter(s) (100 mL/Hr) IV Continuous <Continuous>  heparin   Injectable 5000 Unit(s) SubCutaneous every 12 hours  metoprolol tartrate Injectable 2.5 milliGRAM(s) IV Push every 12 hours  pantoprazole  Injectable 40 milliGRAM(s) IV Push daily    MEDICATIONS  (PRN):  benzocaine 15 mG/menthol 3.6 mG (Sugar-Free) Lozenge 1 Lozenge Oral five times a day PRN Sore Throat  HYDROmorphone  Injectable 0.5 milliGRAM(s) IV Push every 4 hours PRN Severe Pain (7 - 10)  HYDROmorphone  Injectable 0.25 milliGRAM(s) IV Push every 4 hours PRN Moderate Pain (4 - 6)    Allergies    tetracycline (Hives)    Intolerances    Vital Signs Last 24 Hrs  T(C): 36.9 (2021 14:20), Max: 36.9 (2021 18:54)  T(F): 98.5 (2021 14:20), Max: 98.5 (2021 14:20)  HR: 84 (2021 14:20) (72 - 106)  BP: 138/67 (2021 14:20) (103/67 - 138/67)  BP(mean): --  RR: 17 (2021 14:20) (17 - 19)  SpO2: 94% (2021 14:20) (90% - 96%)  I&O's Summary    2021 07:01  -  2021 07:00  --------------------------------------------------------  IN: 1125 mL / OUT: 950 mL / NET: 175 mL    2021 07:01  -  2021 16:06  --------------------------------------------------------  IN: 0 mL / OUT: 200 mL / NET: -200 mL     PHYSICAL EXAM:  TELE: Not on tele  Constitutional: NAD, awake and alert, well-developed  HEENT: Moist Mucous Membranes, Anicteric  Pulmonary: Non-labored, breath sounds are clear but diminished bilaterally, No wheezing, rales or rhonchi  Cardiovascular: Regular, S1 and S2, no murmurs, no rubs, gallops or clicks  Gastrointestinal: Bowel Sounds present, soft, nontender.  NGT to LWS with copious bilious drainage  Lymph: No peripheral edema. No lymphadenopathy.  Skin: No visible rashes or ulcers.  +abdominal incisional dressing dry and intact  Psych:  Mood & affect appropriate  LABS: All Labs Reviewed:                        13.7   8.75  )-----------( 214      ( 2021 06:05 )             41.3                         14.0   4.67  )-----------( 227      ( 2021 09:02 )             42.1     2021 06:05    141    |  105    |  24     ----------------------------<  101    4.3     |  29     |  1.00   2021 09:02    140    |  108    |  20     ----------------------------<  97     4.7     |  24     |  0.87     Ca    8.5        2021 06:05  Ca    8.9        2021 09:02    TPro  7.4    /  Alb  3.8    /  TBili  0.4    /  DBili  x      /  AST  17     /  ALT  8      /  AlkPhos  80     2021 09:02    PT/INR - ( 2021 09:02 )   PT: 12.0 sec;   INR: 1.03 ratio      PTT - ( 2021 09:02 )  PTT:30.9 sec      EXAM:  CT ABDOMEN AND PELVIS OC IC        PROCEDURE DATE:  2021           INTERPRETATION:  CLINICAL INFORMATION: Left-sided abdominal pain. Surgical history of .    COMPARISON: None.    CONTRAST/COMPLICATIONS:  IV Contrast: [id number]  90 cc administered   10 cc discarded  Oral Contrast: Omnipaque 300  Complications: None reported at time of study completion    PROCEDURE:  CT of the Abdomen and Pelvis was performed.  Sagittal and coronal reformats were performed.    FINDINGS:  LOWER CHEST: Within normal limits. Cardiac pacer wire.    LIVER: Within normal limits.  BILE DUCTS: Normal caliber.  GALLBLADDER: Within normal limits.  SPLEEN: Within normal limits.  PANCREAS: Within normal limits.  ADRENALS: Within normal limits.  KIDNEYS/URETERS: Small bilateral renal cysts. Otherwise, within normal limits.    BLADDER: Within normal limits.  REPRODUCTIVE ORGANS: Uterus and adnexa within normal limits.    BOWEL: There is mild to moderate dilatation of mid small bowel loops to 3.5 cm extending to a whorled appearance of the mesentery and bowel, best visualized on images 86 through 95 of series 3, with abrupt luminal narrowing in the midline anterior pelvis on image 92. There is mild concentric wall thickening of mildly dilated small bowel loops distal to the transition which extend into the right pelvis, with a fanlike appearance of the feeding mesentery with mild infiltration, best visualized on image 93 and a second transition to normal caliber distal small bowel alongthe anterior peritoneal reflection on image 92. There is transit of oral contrast to the colon. This constellation of findings is compatible with an adhesive band with a low-grade closed loop obstruction. There is no evidence of pneumatosis or extraluminal gas.    Remaining small and large bowel loops are unremarkable with no evidence of obstruction, bowel wall thickening, or inflammatory stranding of the adjacent mesenteric fat. The minimally distended stomach is unremarkable. The retrocecal appendix is unremarkable.  PERITONEUM: No ascites.  VESSELS: Within normal limits.  RETROPERITONEUM/LYMPH NODES: No lymphadenopathy.  ABDOMINAL WALL: Within normal limits.  BONES: Within normal limits.    IMPRESSION: Low-grade closed loop obstruction of the mid to distal small bowel in the midline anterior pelvis, likely related to postoperative adhesion. Recommend surgical consultation. Results discussed with Dr. Garcia at time of interpretation.    EUGENIA HAND M.D., ATTENDING RADIOLOGIST  This document has been electronically signed. 2021  2:03PM    Ventricular Rate 69 BPM    Atrial Rate 69 BPM    QRS Duration 150 ms    Q-T Interval 452 ms    QTC Calculation(Bazett) 484 ms    P Axis 72 degrees    R Axis -59 degrees    T Axis -2 degrees    Diagnosis Line Ventricular-paced rhythm  Abnormal ECG  No previous ECGs available  Confirmed by FREDA RILEY ([id number]) on 2021 5:21:16 PM

## 2021-06-13 NOTE — PROGRESS NOTE ADULT - SUBJECTIVE AND OBJECTIVE BOX
STATUS POST:  diagnostic laparoscopy with ex lap and JOYCE    POST OPERATIVE DAY #: 1    SUBJECTIVE:  Patient seen and examined at bedside.  No overnight events.  Patient with complaint of NGT discomfort at this time, NPO, admits to some small amount of flatus and denies bowel movement.  Patient denies any fevers, chills, chest pain, shortness of breath, abdominal pain, nausea, vomiting or diarrhea.    Vital Signs Last 24 Hrs  T(C): 36.9 (2021 03:53), Max: 37.2 (2021 11:27)  T(F): 98.4 (2021 03:53), Max: 98.9 (2021 11:27)  HR: 94 (2021 03:53) (70 - 106)  BP: 137/70 (2021 03:53) (103/67 - 150/76)  BP(mean): 100 (2021 11:) (100 - 100)  RR: 17 (2021 03:53) (12 - 19)  SpO2: 96% (2021 03:53) (90% - 100%)    PHYSICAL EXAM:  GENERAL: No acute distress, well-developed  HEAD:  Atraumatic, Normocephalic  ABDOMEN: Soft, mild appropriately- tender, mildly-distended; bowel sounds+ dressings c/d/i.  NEUROLOGY: A&O x 3, no focal deficits    I&O's Summary    2021 07:  -  2021 05:36  --------------------------------------------------------  IN: 125 mL / OUT: 950 mL / NET: -825 mL      I&O's Detail    2021 07:  -  2021 05:36  --------------------------------------------------------  IN:    Lactated Ringers: 125 mL  Total IN: 125 mL    OUT:    Intermittent Catheterization - Urethral (mL): 400 mL    Nasogastric/Oral tube (mL): 550 mL  Total OUT: 950 mL    Total NET: -825 mL        MEDICATIONS  (STANDING):  acetaminophen  IVPB .. 1000 milliGRAM(s) IV Intermittent once  acetaminophen  IVPB .. 1000 milliGRAM(s) IV Intermittent once  acetaminophen  IVPB .. 1000 milliGRAM(s) IV Intermittent once  heparin   Injectable 5000 Unit(s) SubCutaneous every 12 hours  lactated ringers. 1000 milliLiter(s) (100 mL/Hr) IV Continuous <Continuous>  metoprolol tartrate Injectable 2.5 milliGRAM(s) IV Push every 12 hours    MEDICATIONS  (PRN):  benzocaine 15 mG/menthol 3.6 mG (Sugar-Free) Lozenge 1 Lozenge Oral five times a day PRN Sore Throat  benzocaine 20% Spray 1 Spray(s) Topical once PRN sore throat  HYDROmorphone  Injectable 0.5 milliGRAM(s) IV Push every 4 hours PRN Severe Pain (7 - 10)  HYDROmorphone  Injectable 0.25 milliGRAM(s) IV Push every 4 hours PRN Moderate Pain (4 - 6)    LABS:                        14.0   4.67  )-----------( 227      ( 2021 09:02 )             42.1     06-12    140  |  108  |  20  ----------------------------<  97  4.7   |  24  |  0.87    Ca    8.9      2021 09:02    TPro  7.4  /  Alb  3.8  /  TBili  0.4  /  DBili  x   /  AST  17  /  ALT  8<L>  /  AlkPhos  80  06-12    PT/INR - ( 2021 09:02 )   PT: 12.0 sec;   INR: 1.03 ratio         PTT - ( 2021 09:02 )  PTT:30.9 sec  Urinalysis Basic - ( 2021 09:14 )    Color: Yellow / Appearance: Clear / S.020 / pH: x  Gluc: x / Ketone: Trace  / Bili: Negative / Urobili: Negative   Blood: x / Protein: Negative / Nitrite: Negative   Leuk Esterase: Negative / RBC: x / WBC x   Sq Epi: x / Non Sq Epi: x / Bacteria: x      RADIOLOGY & ADDITIONAL STUDIES:  no new

## 2021-06-14 LAB
ANION GAP SERPL CALC-SCNC: 4 MMOL/L — LOW (ref 5–17)
BASOPHILS # BLD AUTO: 0.01 K/UL — SIGNIFICANT CHANGE UP (ref 0–0.2)
BASOPHILS NFR BLD AUTO: 0.1 % — SIGNIFICANT CHANGE UP (ref 0–2)
BUN SERPL-MCNC: 14 MG/DL — SIGNIFICANT CHANGE UP (ref 7–23)
CALCIUM SERPL-MCNC: 8.2 MG/DL — LOW (ref 8.5–10.1)
CHLORIDE SERPL-SCNC: 108 MMOL/L — SIGNIFICANT CHANGE UP (ref 96–108)
CO2 SERPL-SCNC: 30 MMOL/L — SIGNIFICANT CHANGE UP (ref 22–31)
CREAT SERPL-MCNC: 0.68 MG/DL — SIGNIFICANT CHANGE UP (ref 0.5–1.3)
EOSINOPHIL # BLD AUTO: 0.11 K/UL — SIGNIFICANT CHANGE UP (ref 0–0.5)
EOSINOPHIL NFR BLD AUTO: 1.4 % — SIGNIFICANT CHANGE UP (ref 0–6)
GLUCOSE SERPL-MCNC: 134 MG/DL — HIGH (ref 70–99)
HCT VFR BLD CALC: 37.9 % — SIGNIFICANT CHANGE UP (ref 34.5–45)
HGB BLD-MCNC: 12.5 G/DL — SIGNIFICANT CHANGE UP (ref 11.5–15.5)
IMM GRANULOCYTES NFR BLD AUTO: 0.3 % — SIGNIFICANT CHANGE UP (ref 0–1.5)
LYMPHOCYTES # BLD AUTO: 1.11 K/UL — SIGNIFICANT CHANGE UP (ref 1–3.3)
LYMPHOCYTES # BLD AUTO: 14.6 % — SIGNIFICANT CHANGE UP (ref 13–44)
MCHC RBC-ENTMCNC: 30.2 PG — SIGNIFICANT CHANGE UP (ref 27–34)
MCHC RBC-ENTMCNC: 33 GM/DL — SIGNIFICANT CHANGE UP (ref 32–36)
MCV RBC AUTO: 91.5 FL — SIGNIFICANT CHANGE UP (ref 80–100)
MONOCYTES # BLD AUTO: 0.47 K/UL — SIGNIFICANT CHANGE UP (ref 0–0.9)
MONOCYTES NFR BLD AUTO: 6.2 % — SIGNIFICANT CHANGE UP (ref 2–14)
NEUTROPHILS # BLD AUTO: 5.9 K/UL — SIGNIFICANT CHANGE UP (ref 1.8–7.4)
NEUTROPHILS NFR BLD AUTO: 77.4 % — HIGH (ref 43–77)
NRBC # BLD: 0 /100 WBCS — SIGNIFICANT CHANGE UP (ref 0–0)
PLATELET # BLD AUTO: 204 K/UL — SIGNIFICANT CHANGE UP (ref 150–400)
POTASSIUM SERPL-MCNC: 4.5 MMOL/L — SIGNIFICANT CHANGE UP (ref 3.5–5.3)
POTASSIUM SERPL-SCNC: 4.5 MMOL/L — SIGNIFICANT CHANGE UP (ref 3.5–5.3)
RBC # BLD: 4.14 M/UL — SIGNIFICANT CHANGE UP (ref 3.8–5.2)
RBC # FLD: 12.8 % — SIGNIFICANT CHANGE UP (ref 10.3–14.5)
SODIUM SERPL-SCNC: 142 MMOL/L — SIGNIFICANT CHANGE UP (ref 135–145)
WBC # BLD: 7.62 K/UL — SIGNIFICANT CHANGE UP (ref 3.8–10.5)
WBC # FLD AUTO: 7.62 K/UL — SIGNIFICANT CHANGE UP (ref 3.8–10.5)

## 2021-06-14 PROCEDURE — 99232 SBSQ HOSP IP/OBS MODERATE 35: CPT

## 2021-06-14 RX ADMIN — ONDANSETRON 4 MILLIGRAM(S): 8 TABLET, FILM COATED ORAL at 06:41

## 2021-06-14 RX ADMIN — Medication 1000 MILLIGRAM(S): at 06:39

## 2021-06-14 RX ADMIN — Medication 2.5 MILLIGRAM(S): at 17:42

## 2021-06-14 RX ADMIN — Medication 2.5 MILLIGRAM(S): at 06:38

## 2021-06-14 RX ADMIN — DEXTROSE MONOHYDRATE, SODIUM CHLORIDE, AND POTASSIUM CHLORIDE 100 MILLILITER(S): 50; .745; 4.5 INJECTION, SOLUTION INTRAVENOUS at 10:29

## 2021-06-14 RX ADMIN — DEXTROSE MONOHYDRATE, SODIUM CHLORIDE, AND POTASSIUM CHLORIDE 100 MILLILITER(S): 50; .745; 4.5 INJECTION, SOLUTION INTRAVENOUS at 00:12

## 2021-06-14 RX ADMIN — Medication 1000 MILLIGRAM(S): at 19:20

## 2021-06-14 RX ADMIN — HEPARIN SODIUM 5000 UNIT(S): 5000 INJECTION INTRAVENOUS; SUBCUTANEOUS at 06:39

## 2021-06-14 RX ADMIN — PANTOPRAZOLE SODIUM 40 MILLIGRAM(S): 20 TABLET, DELAYED RELEASE ORAL at 12:11

## 2021-06-14 RX ADMIN — HEPARIN SODIUM 5000 UNIT(S): 5000 INJECTION INTRAVENOUS; SUBCUTANEOUS at 17:42

## 2021-06-14 RX ADMIN — Medication 1000 MILLIGRAM(S): at 20:20

## 2021-06-14 RX ADMIN — DEXTROSE MONOHYDRATE, SODIUM CHLORIDE, AND POTASSIUM CHLORIDE 100 MILLILITER(S): 50; .745; 4.5 INJECTION, SOLUTION INTRAVENOUS at 22:10

## 2021-06-14 RX ADMIN — Medication 1000 MILLIGRAM(S): at 07:04

## 2021-06-14 NOTE — PROGRESS NOTE ADULT - SUBJECTIVE AND OBJECTIVE BOX
STATUS POST:  diagnostic laparoscopy with ex lap and JOYCE    POST OPERATIVE DAY #: 2    SUBJECTIVE:  Patient seen and examined at bedside.  No overnight events.  Patient with complaint of NGT discomfort at this time, NPO, admits to flatus and denies bowel movement.  Patient denies any fevers, chills, chest pain, shortness of breath, abdominal pain, nausea, vomiting or diarrhea.    Vital Signs Last 24 Hrs  T(C): 37.1 (2021 21:09), Max: 37.1 (2021 21:09)  T(F): 98.8 (2021 21:09), Max: 98.8 (2021 21:09)  HR: 84 (:) (84 - 88)  BP: 121/67 (2021 21:09) (121/67 - 138/67)  BP(mean): --  RR: 17 (2021 21:09) (17 - 17)  SpO2: 92% (2021 21:) (92% - 94%)    PHYSICAL EXAM:  GENERAL: No acute distress, well-developed  HEAD:  Atraumatic, Normocephalic  ABDOMEN: Soft, minimally-tender, minimally-distended; bowel sounds+  NEUROLOGY: A&O x 3, no focal deficits    I&O's Summary    2021 07:  -  2021 07:00  --------------------------------------------------------  IN: 1125 mL / OUT: 950 mL / NET: 175 mL    2021 07:  -  2021 05:50  --------------------------------------------------------  IN: 2100 mL / OUT: 700 mL / NET: 1400 mL      I&O's Detail    2021 07:  -  2021 07:00  --------------------------------------------------------  IN:    Lactated Ringers: 125 mL    Lactated Ringers: 1000 mL  Total IN: 1125 mL    OUT:    Intermittent Catheterization - Urethral (mL): 400 mL    Nasogastric/Oral tube (mL): 550 mL  Total OUT: 950 mL    Total NET: 175 mL      2021 07:01  -  2021 05:50  --------------------------------------------------------  IN:    dextrose 5% + sodium chloride 0.45% w/ Additives: 1800 mL    Lactated Ringers: 300 mL  Total IN: 2100 mL    OUT:    Intermittent Catheterization - Urethral (mL): 200 mL    Nasogastric/Oral tube (mL): 500 mL  Total OUT: 700 mL    Total NET: 1400 mL        MEDICATIONS  (STANDING):  acetaminophen   Tablet .. 1000 milliGRAM(s) Oral every 6 hours  dextrose 5% + sodium chloride 0.45% with potassium chloride 20 mEq/L 1000 milliLiter(s) (100 mL/Hr) IV Continuous <Continuous>  heparin   Injectable 5000 Unit(s) SubCutaneous every 12 hours  metoprolol tartrate Injectable 2.5 milliGRAM(s) IV Push every 12 hours  pantoprazole  Injectable 40 milliGRAM(s) IV Push daily    MEDICATIONS  (PRN):  benzocaine 15 mG/menthol 3.6 mG (Sugar-Free) Lozenge 1 Lozenge Oral five times a day PRN Sore Throat  HYDROmorphone  Injectable 0.5 milliGRAM(s) IV Push every 4 hours PRN Severe Pain (7 - 10)  HYDROmorphone  Injectable 0.25 milliGRAM(s) IV Push every 4 hours PRN Moderate Pain (4 - 6)  ondansetron Injectable 4 milliGRAM(s) IV Push every 6 hours PRN Nausea and/or Vomiting    LABS:                        13.7   8.75  )-----------( 214      ( 2021 06:05 )             41.3     06-13    141  |  105  |  24<H>  ----------------------------<  101<H>  4.3   |  29  |  1.00    Ca    8.5      2021 06:05    TPro  7.4  /  Alb  3.8  /  TBili  0.4  /  DBili  x   /  AST  17  /  ALT  8<L>  /  AlkPhos  80  06-12    PT/INR - ( 2021 09:02 )   PT: 12.0 sec;   INR: 1.03 ratio         PTT - ( 2021 09:02 )  PTT:30.9 sec  Urinalysis Basic - ( 2021 09:14 )    Color: Yellow / Appearance: Clear / S.020 / pH: x  Gluc: x / Ketone: Trace  / Bili: Negative / Urobili: Negative   Blood: x / Protein: Negative / Nitrite: Negative   Leuk Esterase: Negative / RBC: x / WBC x   Sq Epi: x / Non Sq Epi: x / Bacteria: x      RADIOLOGY & ADDITIONAL STUDIES:

## 2021-06-14 NOTE — PROGRESS NOTE ADULT - SUBJECTIVE AND OBJECTIVE BOX
[facility] -- Junie Sellers, Pushpa, Mario, Toni Pittman, Don Dennison: Office # [phone]    Follow Up: cardiac optimization pre/post procedure     Subjective/Observations: Patient seen and examined. Patient awake, alert, resting in bed. No complaints of chest pain, dyspnea, palpitations or dizziness. No signs of orthopnea or PND. Abdominal discomfort improved. NGT to LWS. Tolerating room air.     REVIEW OF SYSTEMS: All review of systems is negative for eye, ENT, GI, , allergic, dermatologic, musculoskeletal and neurologic except as described above    PAST MEDICAL & SURGICAL HISTORY:  Personal History of Mitral Valve Disease  mitral valve prolapse    s/p AV (Atrioventricular) Roseann Ablation    SVT (supraventricular tachycardia)    SBO (small bowel obstruction)    Cardiac Pacemaker  MDT dual chamber PPM , lead fx w/ system replacement     H/O  section  x1    S/P cataract surgery  Right    MEDICATIONS  (STANDING):  acetaminophen   Tablet .. 1000 milliGRAM(s) Oral every 6 hours  dextrose 5% + sodium chloride 0.45% with potassium chloride 20 mEq/L 1000 milliLiter(s) (100 mL/Hr) IV Continuous <Continuous>  heparin   Injectable 5000 Unit(s) SubCutaneous every 12 hours  metoprolol tartrate Injectable 2.5 milliGRAM(s) IV Push every 12 hours  pantoprazole  Injectable 40 milliGRAM(s) IV Push daily    MEDICATIONS  (PRN):  benzocaine 15 mG/menthol 3.6 mG (Sugar-Free) Lozenge 1 Lozenge Oral five times a day PRN Sore Throat  HYDROmorphone  Injectable 0.5 milliGRAM(s) IV Push every 4 hours PRN Severe Pain (7 - 10)  HYDROmorphone  Injectable 0.25 milliGRAM(s) IV Push every 4 hours PRN Moderate Pain (4 - 6)  ondansetron Injectable 4 milliGRAM(s) IV Push every 6 hours PRN Nausea and/or Vomiting    Allergies  tetracycline (Hives)    Vital Signs Last 24 Hrs  T(C): 37.1 (2021 21:09), Max: 37.1 (2021 21:09)  T(F): 98.8 (2021 21:09), Max: 98.8 (2021 21:09)  HR: 84 (2021 21:09) (84 - 88)  BP: 121/67 (2021 21:09) (121/67 - 138/67)  BP(mean): --  RR: 17 (2021 21:09) (17 - 17)  SpO2: 92% (2021 21:09) (92% - 94%)  I&O's Summary    2021 07:01  -  2021 07:00  --------------------------------------------------------  IN: 2300 mL / OUT: 1200 mL / NET: 1100 mL    TELE: Not on telemetry   PHYSICAL EXAM:  Appearance: NAD, no distress, alert, Well developed   HEENT: Moist Mucous Membranes, Anicteric  Cardiovascular: Regular rate and rhythm, Normal S1 S2, No JVD, No murmurs, No rubs, gallops or clicks  Respiratory: Non-labored, Clear to auscultation, No rales, No rhonchi, No wheezing.   Gastrointestinal:  Soft, Non-tender, + BS, + NGT  Neurologic: Non-focal  Skin: Warm and dry, + abdominal DSD CDI, No visible rashes or ulcers, No ecchymosis, No cyanosis  Musculoskeletal: No clubbing, No cyanosis, No joint swelling/tenderness  Psychiatry: Mood & affect appropriate  Lymph: No peripheral edema.     LABS: All Labs Reviewed:                        12.5   7.62  )-----------( 204      ( 2021 05:48 )             37.9                         13.7   8.75  )-----------( 214      ( 2021 06:05 )             41.3                         14.0   4.67  )-----------( 227      ( 2021 09:02 )             42.1     2021 05:48    142    |  108    |  14     ----------------------------<  134    4.5     |  30     |  0.68   2021 06:05    141    |  105    |  24     ----------------------------<  101    4.3     |  29     |  1.00   2021 09:02    140    |  108    |  20     ----------------------------<  97     4.7     |  24     |  0.87     Ca    8.2        2021 05:48  Ca    8.5        2021 06:05  Ca    8.9        2021 09:02    TPro  7.4    /  Alb  3.8    /  TBili  0.4    /  DBili  x      /  AST  17     /  ALT  8      /  AlkPhos  80     2021 09:02  Lactate, Blood: 0.8 mmol/L (21 @ 09:02)    12 Lead ECG:   Ventricular Rate 69 BPM  Atrial Rate 69 BPM  QRS Duration 150 ms  Q-T Interval 452 ms  QTC Calculation(Bazett) 484 ms  P Axis 72 degrees  R Axis -59 degrees  T Axis -2 degrees  Diagnosis Line Ventricular-paced rhythm  Abnormal ECG  No previous ECGs available  Confirmed by FREDA RILEY ([id number]) on 2021 5:21:16 PM (21 @ 08:37)    < from: Transthoracic Echocardiogram w/ Doppler (05.12.10 @ 14:50) >  Dimensions:    Normal Values:  LA:     3.1    2.0 - 4.0 cm  Ao:     2.9    2.0 - 3.8 cm  SEPTUM: 0.7    0.6 - 1.2 cm  PWT:    0.7    0.6 - 1.1 cm  LVIDd:  5.0    3.0 - 5.6 cm  LVIDs:  3.4    1.8 - 4.0 cm  Derived variables:  LVMI: 70 g/m2  RWT: 0.28  Fractional short: 32 %  Ejection Fraction: 60 %  ------------------------------------------------------------------------  Observations:  Mitral Valve: Mitral annular calcification, otherwise  normal mitral valve.  Aortic Valve/Aorta: Normal trileaflet aortic valve.  Normal aortic root.  Left Atrium: Normal left atrium.  Left Ventricle: Normal left ventricular internal dimensions  and wall thicknesses.  Normal left ventricular systolic function. Mild diastolic  dysfunction (Stage I).  Right Heart: Normal right atrium.  Normal right ventricular size and systolic function.  Normal tricuspid and pulmonic valves.  Pericardium/Pleura: Normal pericardium with no pericardial  effusion.  Doppler:Mild mitral regurgitation.  Mild tricuspid regurgitation. Estimated pulmonary artery  systolic pressure equals 34 mm Hg, assuming right atrial  pressure equals 10  mm Hg.  ------------------------------------------------------------------------  Conclusions:  1. Mild mitral regurgitation.  2. Normal left ventricular systolic function. Mild  diastolic dysfunction (Stage I).  3. Normal right ventricular size and systolic function.  4. Mild tricuspid regurgitation. Estimated pulmonary artery  systolic pressure equals 34 mm Hg, assuming right atrial  pressure equals 10  mm Hg.  < end of copied text >

## 2021-06-15 DIAGNOSIS — E78.5 HYPERLIPIDEMIA, UNSPECIFIED: ICD-10-CM

## 2021-06-15 DIAGNOSIS — Z29.9 ENCOUNTER FOR PROPHYLACTIC MEASURES, UNSPECIFIED: ICD-10-CM

## 2021-06-15 DIAGNOSIS — I47.1 SUPRAVENTRICULAR TACHYCARDIA: ICD-10-CM

## 2021-06-15 DIAGNOSIS — F17.200 NICOTINE DEPENDENCE, UNSPECIFIED, UNCOMPLICATED: ICD-10-CM

## 2021-06-15 LAB
ANION GAP SERPL CALC-SCNC: 4 MMOL/L — LOW (ref 5–17)
BUN SERPL-MCNC: 6 MG/DL — LOW (ref 7–23)
CALCIUM SERPL-MCNC: 8.7 MG/DL — SIGNIFICANT CHANGE UP (ref 8.5–10.1)
CHLORIDE SERPL-SCNC: 111 MMOL/L — HIGH (ref 96–108)
CO2 SERPL-SCNC: 27 MMOL/L — SIGNIFICANT CHANGE UP (ref 22–31)
CREAT SERPL-MCNC: 0.63 MG/DL — SIGNIFICANT CHANGE UP (ref 0.5–1.3)
GLUCOSE SERPL-MCNC: 111 MG/DL — HIGH (ref 70–99)
HCT VFR BLD CALC: 36.3 % — SIGNIFICANT CHANGE UP (ref 34.5–45)
HGB BLD-MCNC: 12.2 G/DL — SIGNIFICANT CHANGE UP (ref 11.5–15.5)
MAGNESIUM SERPL-MCNC: 2.2 MG/DL — SIGNIFICANT CHANGE UP (ref 1.6–2.6)
MCHC RBC-ENTMCNC: 31.3 PG — SIGNIFICANT CHANGE UP (ref 27–34)
MCHC RBC-ENTMCNC: 33.6 GM/DL — SIGNIFICANT CHANGE UP (ref 32–36)
MCV RBC AUTO: 93.1 FL — SIGNIFICANT CHANGE UP (ref 80–100)
NRBC # BLD: 0 /100 WBCS — SIGNIFICANT CHANGE UP (ref 0–0)
PHOSPHATE SERPL-MCNC: 1.9 MG/DL — LOW (ref 2.5–4.5)
PLATELET # BLD AUTO: 193 K/UL — SIGNIFICANT CHANGE UP (ref 150–400)
POTASSIUM SERPL-MCNC: 4.4 MMOL/L — SIGNIFICANT CHANGE UP (ref 3.5–5.3)
POTASSIUM SERPL-SCNC: 4.4 MMOL/L — SIGNIFICANT CHANGE UP (ref 3.5–5.3)
RBC # BLD: 3.9 M/UL — SIGNIFICANT CHANGE UP (ref 3.8–5.2)
RBC # FLD: 12.8 % — SIGNIFICANT CHANGE UP (ref 10.3–14.5)
SODIUM SERPL-SCNC: 142 MMOL/L — SIGNIFICANT CHANGE UP (ref 135–145)
WBC # BLD: 5.85 K/UL — SIGNIFICANT CHANGE UP (ref 3.8–10.5)
WBC # FLD AUTO: 5.85 K/UL — SIGNIFICANT CHANGE UP (ref 3.8–10.5)

## 2021-06-15 PROCEDURE — 99232 SBSQ HOSP IP/OBS MODERATE 35: CPT | Mod: GC

## 2021-06-15 PROCEDURE — 99232 SBSQ HOSP IP/OBS MODERATE 35: CPT

## 2021-06-15 RX ORDER — OXYCODONE AND ACETAMINOPHEN 5; 325 MG/1; MG/1
1 TABLET ORAL EVERY 4 HOURS
Refills: 0 | Status: DISCONTINUED | OUTPATIENT
Start: 2021-06-15 | End: 2021-06-16

## 2021-06-15 RX ORDER — LANOLIN ALCOHOL/MO/W.PET/CERES
5 CREAM (GRAM) TOPICAL AT BEDTIME
Refills: 0 | Status: DISCONTINUED | OUTPATIENT
Start: 2021-06-15 | End: 2021-06-16

## 2021-06-15 RX ORDER — ACETAMINOPHEN 500 MG
650 TABLET ORAL EVERY 6 HOURS
Refills: 0 | Status: DISCONTINUED | OUTPATIENT
Start: 2021-06-15 | End: 2021-06-16

## 2021-06-15 RX ORDER — ATORVASTATIN CALCIUM 80 MG/1
40 TABLET, FILM COATED ORAL AT BEDTIME
Refills: 0 | Status: DISCONTINUED | OUTPATIENT
Start: 2021-06-15 | End: 2021-06-16

## 2021-06-15 RX ORDER — METOPROLOL TARTRATE 50 MG
25 TABLET ORAL DAILY
Refills: 0 | Status: DISCONTINUED | OUTPATIENT
Start: 2021-06-15 | End: 2021-06-16

## 2021-06-15 RX ADMIN — Medication 5 MILLIGRAM(S): at 22:30

## 2021-06-15 RX ADMIN — Medication 1000 MILLIGRAM(S): at 00:21

## 2021-06-15 RX ADMIN — ATORVASTATIN CALCIUM 40 MILLIGRAM(S): 80 TABLET, FILM COATED ORAL at 21:09

## 2021-06-15 RX ADMIN — Medication 650 MILLIGRAM(S): at 21:09

## 2021-06-15 RX ADMIN — Medication 650 MILLIGRAM(S): at 21:39

## 2021-06-15 RX ADMIN — Medication 2.5 MILLIGRAM(S): at 05:17

## 2021-06-15 RX ADMIN — HEPARIN SODIUM 5000 UNIT(S): 5000 INJECTION INTRAVENOUS; SUBCUTANEOUS at 17:42

## 2021-06-15 RX ADMIN — DEXTROSE MONOHYDRATE, SODIUM CHLORIDE, AND POTASSIUM CHLORIDE 100 MILLILITER(S): 50; .745; 4.5 INJECTION, SOLUTION INTRAVENOUS at 08:21

## 2021-06-15 RX ADMIN — HEPARIN SODIUM 5000 UNIT(S): 5000 INJECTION INTRAVENOUS; SUBCUTANEOUS at 05:18

## 2021-06-15 RX ADMIN — Medication 1000 MILLIGRAM(S): at 01:21

## 2021-06-15 NOTE — PROGRESS NOTE ADULT - PROBLEM SELECTOR PLAN 4
Patient now unsure if she wants to proceed with nicotine patch as previously discussed   - Patient demonstrated clear understanding of risks of continued tobacco use, agreed to engage in further discussions Patient states she does not desire nicotine patch at this time  - Patient demonstrated clear understanding of risks of continued tobacco use, agreed to engage in further discussions

## 2021-06-15 NOTE — PROGRESS NOTE ADULT - SUBJECTIVE AND OBJECTIVE BOX
[facility] -- Junie Sellers, Pushpa, Mario, Toni Pittman, Don Dennison: Office # [phone]    Follow Up:  cardiac optimization pre/post procedure     Subjective/Observations:  Patient seen and examined. Patient awake, alert, resting in bed. No complaints of chest pain, dyspnea, palpitations or dizziness. No signs of orthopnea or PND. Abdominal discomfort improved. Tolerating room air. NGT d/c. Tolerating clears.      REVIEW OF SYSTEMS: All review of systems is negative for eye, ENT, GI, , allergic, dermatologic, musculoskeletal and neurologic except as described above    PAST MEDICAL & SURGICAL HISTORY:  Personal History of Mitral Valve Disease  mitral valve prolapse    s/p AV (Atrioventricular) Roseann Ablation    SVT (supraventricular tachycardia)    SBO (small bowel obstruction)    Cardiac Pacemaker  MDT dual chamber PPM , lead fx w/ system replacement     H/O  section  x1    S/P cataract surgery  Right    MEDICATIONS  (STANDING):  acetaminophen   Tablet .. 1000 milliGRAM(s) Oral every 6 hours  dextrose 5% + sodium chloride 0.45% with potassium chloride 20 mEq/L 1000 milliLiter(s) (100 mL/Hr) IV Continuous <Continuous>  heparin   Injectable 5000 Unit(s) SubCutaneous every 12 hours  metoprolol tartrate Injectable 2.5 milliGRAM(s) IV Push every 12 hours  pantoprazole  Injectable 40 milliGRAM(s) IV Push daily    MEDICATIONS  (PRN):  benzocaine 15 mG/menthol 3.6 mG (Sugar-Free) Lozenge 1 Lozenge Oral five times a day PRN Sore Throat  HYDROmorphone  Injectable 0.5 milliGRAM(s) IV Push every 4 hours PRN Severe Pain (7 - 10)  HYDROmorphone  Injectable 0.25 milliGRAM(s) IV Push every 4 hours PRN Moderate Pain (4 - 6)    Allergies  tetracycline (Hives)    Vital Signs Last 24 Hrs  T(C): 36.9 (15 Juan 2021 04:23), Max: 37.1 (2021 13:11)  T(F): 98.5 (15 Juan 2021 04:23), Max: 98.7 (2021 13:11)  HR: 72 (15 Juan 2021 04:23) (72 - 90)  BP: 131/76 (15 Juan 2021 04:23) (125/71 - 138/67)  BP(mean): --  RR: 17 (15 Juan 2021 04:23) (16 - 18)  SpO2: 94% (15 Juan 2021 04:23) (91% - 94%)  I&O's Summary    2021 07:01  -  15 Juan 2021 07:00  --------------------------------------------------------  IN: 1200 mL / OUT: 250 mL / NET: 950 mL    TELE: Not on telemetry   PHYSICAL EXAM:  Appearance: NAD, no distress, alert, Well developed   HEENT: Moist Mucous Membranes, Anicteric  Cardiovascular: Regular rate and rhythm, Normal S1 S2, No JVD, No murmurs, No rubs, gallops or clicks  Respiratory: Non-labored, Clear to auscultation, No rales, No rhonchi, No wheezing.   Gastrointestinal:  Soft, Non-tender, + BS  Neurologic: Non-focal  Skin: Warm and dry,+ abdominal DSD CDI , No ecchymosis, No cyanosis  Musculoskeletal: No clubbing, No cyanosis, No joint swelling/tenderness  Psychiatry: Mood & affect appropriate  Lymph: No peripheral edema.     LABS: All Labs Reviewed:                        12.2   5.85  )-----------( 193      ( 15 Juan 2021 06:56 )             36.3                         12.5   7.62  )-----------( 204      ( 2021 05:48 )             37.9                         13.7   8.75  )-----------( 214      ( 2021 06:05 )             41.3     15 Juan 2021 06:56    142    |  111    |  6      ----------------------------<  111    4.4     |  27     |  0.63   2021 05:48    142    |  108    |  14     ----------------------------<  134    4.5     |  30     |  0.68   2021 06:05    141    |  105    |  24     ----------------------------<  101    4.3     |  29     |  1.00     Ca    8.7        15 Juan 2021 06:56  Ca    8.2        2021 05:48  Ca    8.5        2021 06:05  Phos  1.9       15 Juan 2021 06:56  Mg     2.2       15 Juan 2021 06:56    12 Lead ECG:   Ventricular Rate 69 BPM  Atrial Rate 69 BPM  QRS Duration 150 ms  Q-T Interval 452 ms  QTC Calculation(Bazett) 484 ms  P Axis 72 degrees  R Axis -59 degrees  T Axis -2 degrees  Diagnosis Line Ventricular-paced rhythm  Abnormal ECG  No previous ECGs available  Confirmed by FREDA RILEY ([id number]) on 2021 5:21:16 PM (21 @ 08:37)    < from: Transthoracic Echocardiogram w/ Doppler (05.12.10 @ 14:50) >  Dimensions:    Normal Values:  LA:     3.1    2.0 - 4.0 cm  Ao:     2.9    2.0 - 3.8 cm  SEPTUM: 0.7    0.6 - 1.2 cm  PWT:    0.7    0.6 - 1.1 cm  LVIDd:  5.0    3.0 - 5.6 cm  LVIDs:  3.4    1.8 - 4.0 cm  Derived variables:  LVMI: 70 g/m2  RWT: 0.28  Fractional short: 32 %  Ejection Fraction: 60 %  ------------------------------------------------------------------------  Observations:  Mitral Valve: Mitral annular calcification, otherwise  normal mitral valve.  Aortic Valve/Aorta: Normal trileaflet aortic valve.  Normal aortic root.  Left Atrium: Normal left atrium.  Left Ventricle: Normal left ventricular internal dimensions  and wall thicknesses.  Normal left ventricular systolic function. Mild diastolic  dysfunction (Stage I).  Right Heart: Normal right atrium.  Normal right ventricular size and systolic function.  Normal tricuspid and pulmonic valves.  Pericardium/Pleura: Normal pericardium with no pericardial  effusion.  Doppler:Mild mitral regurgitation.  Mild tricuspid regurgitation. Estimated pulmonary artery  systolic pressure equals 34 mm Hg, assuming right atrial  pressure equals 10  mm Hg.  ------------------------------------------------------------------------  Conclusions:  1. Mild mitral regurgitation.  2. Normal left ventricular systolic function. Mild  diastolic dysfunction (Stage I).  3. Normal right ventricular size and systolic function.  4. Mild tricuspid regurgitation. Estimated pulmonary artery  systolic pressure equals 34 mm Hg, assuming right atrial  pressure equals 10  mm Hg.  < end of copied text >

## 2021-06-15 NOTE — PROGRESS NOTE ADULT - SUBJECTIVE AND OBJECTIVE BOX
SURGERY PA NOTE ON BEHALF OF DR. BARRAGAN / GENERAL SURGERY:    S: Patient seen and examined at bedside.  Reports further improvement, denies abdominal pain.  Tolerating CLD well without n/v.  Reports passing good amounts of flatus.  No BM as of yet.  Has been ambulating without issue.      MEDICATIONS:  acetaminophen   Tablet .. 650 milliGRAM(s) Oral every 6 hours PRN  heparin   Injectable 5000 Unit(s) SubCutaneous every 12 hours  metoprolol tartrate Injectable 2.5 milliGRAM(s) IV Push every 12 hours  oxycodone    5 mG/acetaminophen 325 mG 1 Tablet(s) Oral every 4 hours PRN      O:  Vital Signs Last 24 Hrs  T(C): 36.9 (15 Juan 2021 04:23), Max: 37.1 (14 Jun 2021 13:11)  T(F): 98.5 (15 Juan 2021 04:23), Max: 98.7 (14 Jun 2021 13:11)  HR: 72 (15 Juan 2021 04:23) (72 - 90)  BP: 131/76 (15 Juan 2021 04:23) (125/71 - 138/67)  BP(mean): --  RR: 17 (15 Juan 2021 04:23) (16 - 18)  SpO2: 94% (15 Juan 2021 04:23) (91% - 94%)    I&O SUMMARY:    06-14-21 @ 07:01  -  06-15-21 @ 07:00  --------------------------------------------------------  IN: 1200 mL / OUT: 250 mL / NET: 950 mL        PHYSICAL EXAM:  Lungs: CTA bilat without W/R/R  Card: S1S2  Abd: Steri-strips over incisions C/D/I.  Some neeraj-incisional soreness noted.  Soft, NT, very mild distention appreciated.  +BS x 4.  No rebound/guarding.    Ext: Calves soft, NT, without edema bilat    LABS:                        12.2   5.85  )-----------( 193      ( 15 Juan 2021 06:56 )             36.3     06-15    142  |  111<H>  |  6<L>  ----------------------------<  111<H>  4.4   |  27  |  0.63    Ca    8.7      15 Juan 2021 06:56  Phos  1.9     06-15  Mg     2.2     06-15      A:  72 yo female s/p ex-lap with lysis of abdominal adhesions for sbo    P:  Patient doing very well   No leukocytosis, vitals stable and reassuring, remains afebrile  Tolerating CLD, passing flatus  Will advance to LR reg diet, and follow tolerance  If tolerates, and has bm - stable for d/c to home  Patient discussed and seen in conjunction with Dr. Barragan  Will follow

## 2021-06-15 NOTE — PROGRESS NOTE ADULT - PROBLEM SELECTOR PLAN 3
Chronic, stable  - Would resume home statin given patient tolerating PO diet Chronic, stable  - Would resume home statin given patient tolerating PO diet (patient takes rosuvastatin at home- non-formulary, will therapeutic interchange with atorvastatin)

## 2021-06-15 NOTE — PROGRESS NOTE ADULT - SUBJECTIVE AND OBJECTIVE BOX
Patient is a 71y old  Female who presents with a chief complaint of Abdominal pain, closed loop SBO seen on CT scan      ----  INTERVAL HPI/OVERNIGHT EVENTS: Patient seen and evaluated at the bedside. No acute overnight events occurred.     ----  PAST MEDICAL & SURGICAL HISTORY:  Personal History of Mitral Valve Disease mitral valve prolapse  s/p AV (Atrioventricular) Roseann Ablation  SVT (supraventricular tachycardia)  SBO (small bowel obstruction)  Cardiac Pacemaker  MDT dual chamber PPM , lead fx w/ system replacement   H/O  section x1  S/P cataract surgery Right      FAMILY HISTORY:  No pertinent family history in first degree relatives      Home Medications:  aspirin 81 mg oral tablet: orally once a day (2021 11:59)  Calcium 600+D oral tablet: 2 tab(s) orally once a day (2021 11:59)  rosuvastatin 10 mg oral tablet: 1 tab(s) orally once a day (2021 11:59)  Toprol-XL 25 mg oral tablet, extended release: 1 tab(s) orally once a day (2021 11:59)  Vitamin D3 5000 intl units (125 mcg) oral capsule: 1 tab(s) orally once a day (2021 11:59)      Allergies  tetracycline (Hives)    ----  MEDICATIONS  (STANDING):  heparin   Injectable 5000 Unit(s) SubCutaneous every 12 hours  metoprolol tartrate Injectable 2.5 milliGRAM(s) IV Push every 12 hours    MEDICATIONS  (PRN):  acetaminophen   Tablet .. 650 milliGRAM(s) Oral every 6 hours PRN Mild Pain (1 - 3)  oxycodone    5 mG/acetaminophen 325 mG 1 Tablet(s) Oral every 4 hours PRN Moderate Pain (4 - 6)      ----  REVIEW OF SYSTEMS:    ----  PHYSICAL EXAM:      T(C): 36.9 (06-15-21 @ 04:23), Max: 37.1 (21 @ 13:11)  HR: 72 (06-15-21 @ 04:23) (72 - 90)  BP: 131/76 (06-15-21 @ 04:23) (125/71 - 138/67)  RR: 17 (06-15-21 @ 04:23) (16 - 18)  SpO2: 94% (06-15-21 @ 04:23) (91% - 94%)  Wt(kg): --    ----  I&O's Summary    2021 07:01  -  15 2021 07:00  --------------------------------------------------------  IN: 1200 mL / OUT: 250 mL / NET: 950 mL        LABS:                        12.2   5.85  )-----------( 193      ( 15 Juan 2021 06:56 )             36.3     06-15    142  |  111<H>  |  6<L>  ----------------------------<  111<H>  4.4   |  27  |  0.63    Ca    8.7      15 Juan 2021 06:56  Phos  1.9     06-15  Mg     2.2     06-15    ----  Personally reviewed:  Vital sign trends: [ x ] yes    [  ] no     [  ] n/a  Laboratory results: [ x ] yes    [  ] no     [  ] n/a  Radiology results: [  ] yes    [  ] no     [  ] n/a  Culture results: [  ] yes    [  ] no     [ x ] n/a  Consultant recommendations: [ x ] yes    [  ] no     [  ] n/a Patient is a 71y old  Female who presents with a chief complaint of Abdominal pain, closed loop SBO seen on CT scan    ----  INTERVAL HPI/OVERNIGHT EVENTS: Patient seen and evaluated at the bedside. No acute overnight events occurred.     ----  PAST MEDICAL & SURGICAL HISTORY:  Personal History of Mitral Valve Disease mitral valve prolapse  s/p AV (Atrioventricular) Roseann Ablation  SVT (supraventricular tachycardia)  SBO (small bowel obstruction)  Cardiac Pacemaker  MDT dual chamber PPM , lead fx w/ system replacement   H/O  section x1  S/P cataract surgery Right      FAMILY HISTORY:  No pertinent family history in first degree relatives      Home Medications:  aspirin 81 mg oral tablet: orally once a day (2021 11:59)  Calcium 600+D oral tablet: 2 tab(s) orally once a day (2021 11:59)  rosuvastatin 10 mg oral tablet: 1 tab(s) orally once a day (2021 11:59)  Toprol-XL 25 mg oral tablet, extended release: 1 tab(s) orally once a day (2021 11:59)  Vitamin D3 5000 intl units (125 mcg) oral capsule: 1 tab(s) orally once a day (2021 11:59)      Allergies  tetracycline (Hives)    ----  MEDICATIONS  (STANDING):  heparin   Injectable 5000 Unit(s) SubCutaneous every 12 hours  metoprolol tartrate Injectable 2.5 milliGRAM(s) IV Push every 12 hours    MEDICATIONS  (PRN):  acetaminophen   Tablet .. 650 milliGRAM(s) Oral every 6 hours PRN Mild Pain (1 - 3)  oxycodone    5 mG/acetaminophen 325 mG 1 Tablet(s) Oral every 4 hours PRN Moderate Pain (4 - 6)      ----  REVIEW OF SYSTEMS:    ----  PHYSICAL EXAM:      T(C): 36.9 (06-15-21 @ 04:23), Max: 37.1 (21 @ 13:11)  HR: 72 (06-15-21 @ 04:23) (72 - 90)  BP: 131/76 (06-15-21 @ 04:23) (125/71 - 138/67)  RR: 17 (06-15-21 @ 04:23) (16 - 18)  SpO2: 94% (06-15-21 @ 04:23) (91% - 94%)  Wt(kg): --    ----  I&O's Summary    2021 07:01  -  15 2021 07:00  --------------------------------------------------------  IN: 1200 mL / OUT: 250 mL / NET: 950 mL        LABS:                        12.2   5.85  )-----------( 193      ( 15 Juan 2021 06:56 )             36.3     06-15    142  |  111<H>  |  6<L>  ----------------------------<  111<H>  4.4   |  27  |  0.63    Ca    8.7      15 Juan 2021 06:56  Phos  1.9     06-15  Mg     2.2     06-15    ----  Personally reviewed:  Vital sign trends: [ x ] yes    [  ] no     [  ] n/a  Laboratory results: [ x ] yes    [  ] no     [  ] n/a  Radiology results: [  ] yes    [  ] no     [  ] n/a  Culture results: [  ] yes    [  ] no     [ x ] n/a  Consultant recommendations: [ x ] yes    [  ] no     [  ] n/a Patient is a 71y old  Female who presents with a chief complaint of Abdominal pain, closed loop SBO seen on CT scan    ----  INTERVAL HPI/OVERNIGHT EVENTS: Patient seen and evaluated at the bedside. No acute overnight events occurred. Patient reports abdominal pain has resolved, incision site pain well controlled. NGT pulled, patient tolerating low fiber diet. Patient is passing flatus, no BMs.     ----  PAST MEDICAL & SURGICAL HISTORY:  Personal History of Mitral Valve Disease mitral valve prolapse  s/p AV (Atrioventricular) Roseann Ablation  SVT (supraventricular tachycardia)  SBO (small bowel obstruction)  Cardiac Pacemaker  MDT dual chamber PPM , lead fx w/ system replacement   H/O  section x1  S/P cataract surgery Right      FAMILY HISTORY:  No pertinent family history in first degree relatives    Home Medications:  aspirin 81 mg oral tablet: orally once a day (2021 11:59)  Calcium 600+D oral tablet: 2 tab(s) orally once a day (2021 11:59)  rosuvastatin 10 mg oral tablet: 1 tab(s) orally once a day (2021 11:59)  Toprol-XL 25 mg oral tablet, extended release: 1 tab(s) orally once a day (2021 11:59)  Vitamin D3 5000 intl units (125 mcg) oral capsule: 1 tab(s) orally once a day (2021 11:59)    Allergies  tetracycline (Hives)    ----  MEDICATIONS  (STANDING):  heparin   Injectable 5000 Unit(s) SubCutaneous every 12 hours  metoprolol tartrate Injectable 2.5 milliGRAM(s) IV Push every 12 hours    MEDICATIONS  (PRN):  acetaminophen   Tablet .. 650 milliGRAM(s) Oral every 6 hours PRN Mild Pain (1 - 3)  oxycodone    5 mG/acetaminophen 325 mG 1 Tablet(s) Oral every 4 hours PRN Moderate Pain (4 - 6)    ----  REVIEW OF SYSTEMS:  CONSTITUTIONAL: denies fever, chills, fatigue, weakness  HEENT:denies dizziness or lightheadedness, blurred vision, sore throat  SKIN: denies new lesions, rash, itching  CARDIOVASCULAR: denies chest pain, chest pressure, palpitations  RESPIRATORY: denies shortness of breath, cough, sputum production, wheezing  GASTROINTESTINAL: denies nausea, vomiting, diarrhea, constipation, abdominal pain, bloody stools  GENITOURINARY: denies painful urination, increased frequency, urgency, or bloody urine  NEUROLOGICAL: denies numbness, headache, focal weakness  MUSCULOSKELETAL: denies new joint pain, muscle aches  HEMATOLOGIC: denies gross bleeding, bruising  LYMPHATICS: denies enlarged lymph nodes, extremity swelling  PSYCHIATRIC: denies recent changes in anxiety, depression  ENDOCRINOLOGIC: denies sweating, cold or heat intolerance    ----  PHYSICAL EXAM:  GENERAL: patient appears in no acute distress, appropriately interactive  EYES: EOMI b/l, PERRLA,sclera clear, no exudates  HENMT: NC/AT, oropharynx clear without erythema, moist mucous membranes  NECK: supple, soft, no thyromegaly noted  LUNGS: good air entry b/l, clear to auscultation, symmetric breath sounds, no wheezing or rhonchi appreciated  HEART: RRR, +S1/S2, no murmurs noted, no edema to b/l LE  GASTROINTESTINAL: abdomen is soft, NTND, normoactive bowel sounds x4 quadrants, no palpable masses  INTEGUMENT: good skin turgor, appears well perfused, no visualized rashes, no jaundice noted  EXTREMITIES: no clubbing or cyanosis, no obvious deformity, 2+ peripheral pulses  NEUROLOGIC: AA&O x3, motor strength grossly intact, no obvious sensory deficits  PSYCHIATRIC: mood is good, affect is congruent with mood, linear and logical thought process  HEME/LYMPH: no palpable supraclavicular nodules, no obvious ecchymosis       T(C): 36.9 (06-15-21 @ 04:23), Max: 37.1 (21 @ 13:11)  HR: 72 (06-15-21 @ 04:23) (72 - 90)  BP: 131/76 (06-15-21 @ 04:23) (125/71 - 138/67)  RR: 17 (06-15-21 @ 04:23) (16 - 18)  SpO2: 94% (06-15-21 @ 04:23) (91% - 94%)  Wt(kg): --    ----  I&O's Summary    2021 07:01  -  15 Juan 2021 07:00  --------------------------------------------------------  IN: 1200 mL / OUT: 250 mL / NET: 950 mL        LABS:                        12.2   5.85  )-----------( 193      ( 15 Juan 2021 06:56 )             36.3     06-15    142  |  111<H>  |  6<L>  ----------------------------<  111<H>  4.4   |  27  |  0.63    Ca    8.7      15 Juan 2021 06:56  Phos  1.9     15  Mg     2.2     -15    ----  Personally reviewed:  Vital sign trends: [ x ] yes    [  ] no     [  ] n/a  Laboratory results: [ x ] yes    [  ] no     [  ] n/a  Radiology results: [  ] yes    [  ] no     [  ] n/a  Culture results: [  ] yes    [  ] no     [ x ] n/a  Consultant recommendations: [ x ] yes    [  ] no     [  ] n/a Patient is a 71y old  Female who presents with a chief complaint of Abdominal pain, closed loop SBO seen on CT scan    ----  INTERVAL HPI/OVERNIGHT EVENTS: Patient seen and evaluated at the bedside. No acute overnight events occurred. Patient reports abdominal pain has resolved, incision site pain well controlled. NGT pulled, patient tolerating low fiber diet. Patient is passing flatus, no BMs.     ----  PAST MEDICAL & SURGICAL HISTORY:  Personal History of Mitral Valve Disease mitral valve prolapse  s/p AV (Atrioventricular) Roseann Ablation  SVT (supraventricular tachycardia)  SBO (small bowel obstruction)  Cardiac Pacemaker  MDT dual chamber PPM , lead fx w/ system replacement   H/O  section x1  S/P cataract surgery Right      FAMILY HISTORY:  No pertinent family history in first degree relatives    Home Medications:  aspirin 81 mg oral tablet: orally once a day (2021 11:59)  Calcium 600+D oral tablet: 2 tab(s) orally once a day (2021 11:59)  rosuvastatin 10 mg oral tablet: 1 tab(s) orally once a day (2021 11:59)  Toprol-XL 25 mg oral tablet, extended release: 1 tab(s) orally once a day (2021 11:59)  Vitamin D3 5000 intl units (125 mcg) oral capsule: 1 tab(s) orally once a day (2021 11:59)    Allergies  tetracycline (Hives)    ----  MEDICATIONS  (STANDING):  heparin   Injectable 5000 Unit(s) SubCutaneous every 12 hours  metoprolol tartrate Injectable 2.5 milliGRAM(s) IV Push every 12 hours    MEDICATIONS  (PRN):  acetaminophen   Tablet .. 650 milliGRAM(s) Oral every 6 hours PRN Mild Pain (1 - 3)  oxycodone    5 mG/acetaminophen 325 mG 1 Tablet(s) Oral every 4 hours PRN Moderate Pain (4 - 6)    ----  REVIEW OF SYSTEMS:  CONSTITUTIONAL: denies fever, chills, fatigue, weakness  HEENT:denies dizziness or lightheadedness, blurred vision, sore throat  SKIN: denies new lesions, rash, itching  CARDIOVASCULAR: denies chest pain, chest pressure, palpitations  RESPIRATORY: denies shortness of breath, cough, sputum production, wheezing  GASTROINTESTINAL: denies nausea, vomiting, diarrhea, constipation, abdominal pain, bloody stools  GENITOURINARY: denies painful urination, increased frequency, urgency, or bloody urine  NEUROLOGICAL: denies numbness, headache, focal weakness  MUSCULOSKELETAL: denies new joint pain, muscle aches  HEMATOLOGIC: denies gross bleeding, bruising  LYMPHATICS: denies enlarged lymph nodes, extremity swelling  PSYCHIATRIC: denies recent changes in anxiety, depression  ENDOCRINOLOGIC: denies sweating, cold or heat intolerance    ----  PHYSICAL EXAM:  GENERAL: patient appears in no acute distress, appropriately interactive  EYES: EOMI b/l, PERRLA,sclera clear, no exudates  HENMT: NC/AT, oropharynx clear without erythema, moist mucous membranes  NECK: supple, soft, no thyromegaly noted  LUNGS: good air entry b/l, clear to auscultation, symmetric breath sounds, no wheezing or rhonchi appreciated  HEART: RRR, +S1/S2, no murmurs noted, no edema to b/l LE  GASTROINTESTINAL: abdomen is soft, NTND, normoactive bowel sounds x4 quadrants, no palpable masses  INTEGUMENT: good skin turgor, appears well perfused, no visualized rashes, no jaundice noted  EXTREMITIES: no clubbing or cyanosis, no obvious deformity, 2+ peripheral pulses  NEUROLOGIC: AA&O x3, motor strength grossly intact, no obvious sensory deficits  PSYCHIATRIC: mood is good, affect is congruent with mood, linear and logical thought process  HEME/LYMPH: no palpable supraclavicular nodules, no obvious ecchymosis       T(C): 36.9 (06-15-21 @ 04:23), Max: 37.1 (21 @ 13:11)  HR: 72 (06-15-21 @ 04:23) (72 - 90)  BP: 131/76 (06-15-21 @ 04:23) (125/71 - 138/67)  RR: 17 (06-15-21 @ 04:23) (16 - 18)  SpO2: 94% (06-15-21 @ 04:23) (91% - 94%)  Wt(kg): --    ----  I&O's Summary    2021 07:01  -  15 Juan 2021 07:00  --------------------------------------------------------  IN: 1200 mL / OUT: 250 mL / NET: 950 mL        LABS:                        12.2   5.85  )-----------( 193      ( 15 Juan 2021 06:56 )             36.3     06-15    142  |  111<H>  |  6<L>  ----------------------------<  111<H>  4.4   |  27  |  0.63    Ca    8.7      15 Juan 2021 06:56  Phos  1.9     06-15  Mg     2.2     15    ----  Personally reviewed:  Vital sign trends: [ x ] yes    [  ] no     [  ] n/a  Laboratory results: [ x ] yes    [  ] no     [  ] n/a  Radiology results: [ x ] yes    [  ] no     [  ] n/a  Culture results: [  ] yes    [  ] no     [ x ] n/a  Consultant recommendations: [ x ] yes    [  ] no     [  ] n/a

## 2021-06-16 ENCOUNTER — TRANSCRIPTION ENCOUNTER (OUTPATIENT)
Age: 71
End: 2021-06-16

## 2021-06-16 VITALS
OXYGEN SATURATION: 95 % | SYSTOLIC BLOOD PRESSURE: 100 MMHG | HEART RATE: 62 BPM | RESPIRATION RATE: 19 BRPM | DIASTOLIC BLOOD PRESSURE: 59 MMHG

## 2021-06-16 PROCEDURE — 99232 SBSQ HOSP IP/OBS MODERATE 35: CPT | Mod: GC

## 2021-06-16 PROCEDURE — 96360 HYDRATION IV INFUSION INIT: CPT

## 2021-06-16 PROCEDURE — 85025 COMPLETE CBC W/AUTO DIFF WBC: CPT

## 2021-06-16 PROCEDURE — C1765: CPT

## 2021-06-16 PROCEDURE — 86901 BLOOD TYPING SEROLOGIC RH(D): CPT

## 2021-06-16 PROCEDURE — 99285 EMERGENCY DEPT VISIT HI MDM: CPT | Mod: 25

## 2021-06-16 PROCEDURE — U0003: CPT

## 2021-06-16 PROCEDURE — 80053 COMPREHEN METABOLIC PANEL: CPT

## 2021-06-16 PROCEDURE — 80048 BASIC METABOLIC PNL TOTAL CA: CPT

## 2021-06-16 PROCEDURE — 86803 HEPATITIS C AB TEST: CPT

## 2021-06-16 PROCEDURE — 96361 HYDRATE IV INFUSION ADD-ON: CPT

## 2021-06-16 PROCEDURE — 83605 ASSAY OF LACTIC ACID: CPT

## 2021-06-16 PROCEDURE — 36415 COLL VENOUS BLD VENIPUNCTURE: CPT

## 2021-06-16 PROCEDURE — 81003 URINALYSIS AUTO W/O SCOPE: CPT

## 2021-06-16 PROCEDURE — 85027 COMPLETE CBC AUTOMATED: CPT

## 2021-06-16 PROCEDURE — 84100 ASSAY OF PHOSPHORUS: CPT

## 2021-06-16 PROCEDURE — 86900 BLOOD TYPING SEROLOGIC ABO: CPT

## 2021-06-16 PROCEDURE — 86850 RBC ANTIBODY SCREEN: CPT

## 2021-06-16 PROCEDURE — 85730 THROMBOPLASTIN TIME PARTIAL: CPT

## 2021-06-16 PROCEDURE — 99232 SBSQ HOSP IP/OBS MODERATE 35: CPT

## 2021-06-16 PROCEDURE — 83735 ASSAY OF MAGNESIUM: CPT

## 2021-06-16 PROCEDURE — 85610 PROTHROMBIN TIME: CPT

## 2021-06-16 PROCEDURE — U0005: CPT

## 2021-06-16 PROCEDURE — 86769 SARS-COV-2 COVID-19 ANTIBODY: CPT

## 2021-06-16 PROCEDURE — 93005 ELECTROCARDIOGRAM TRACING: CPT

## 2021-06-16 PROCEDURE — 83690 ASSAY OF LIPASE: CPT

## 2021-06-16 RX ORDER — SODIUM,POTASSIUM PHOSPHATES 278-250MG
2 POWDER IN PACKET (EA) ORAL ONCE
Refills: 0 | Status: COMPLETED | OUTPATIENT
Start: 2021-06-16 | End: 2021-06-16

## 2021-06-16 RX ORDER — IBUPROFEN 200 MG
1 TABLET ORAL
Qty: 0 | Refills: 0 | DISCHARGE

## 2021-06-16 RX ORDER — ASPIRIN/CALCIUM CARB/MAGNESIUM 324 MG
81 TABLET ORAL DAILY
Refills: 0 | Status: DISCONTINUED | OUTPATIENT
Start: 2021-06-16 | End: 2021-06-16

## 2021-06-16 RX ORDER — ACETAMINOPHEN 500 MG
2 TABLET ORAL
Qty: 0 | Refills: 0 | DISCHARGE

## 2021-06-16 RX ADMIN — Medication 81 MILLIGRAM(S): at 11:38

## 2021-06-16 RX ADMIN — Medication 2 PACKET(S): at 09:22

## 2021-06-16 RX ADMIN — HEPARIN SODIUM 5000 UNIT(S): 5000 INJECTION INTRAVENOUS; SUBCUTANEOUS at 05:23

## 2021-06-16 NOTE — PROGRESS NOTE ADULT - SUBJECTIVE AND OBJECTIVE BOX
SURGERY PA NOTE ON BEHALF OF DR. BARRAGAN / GENERAL SURGERY:    S: Patient seen and examined at bedside.  Denies complaints.  Tolerating diet, passing flatus.  Denies N/V, abdominal pain.  No overnight events.  Ambulating without issue, +urinating.  Since evaluating patient (earlier this morning) - also has had BM this morning.      MEDICATIONS:  acetaminophen   Tablet .. 650 milliGRAM(s) Oral every 6 hours PRN  aspirin enteric coated 81 milliGRAM(s) Oral daily  atorvastatin 40 milliGRAM(s) Oral at bedtime  heparin   Injectable 5000 Unit(s) SubCutaneous every 12 hours  melatonin 5 milliGRAM(s) Oral at bedtime  metoprolol succinate ER 25 milliGRAM(s) Oral daily  oxycodone    5 mG/acetaminophen 325 mG 1 Tablet(s) Oral every 4 hours PRN      O:  Vital Signs Last 24 Hrs  T(C): 36.9 (16 Jun 2021 05:12), Max: 36.9 (15 Juan 2021 20:54)  T(F): 98.4 (16 Jun 2021 05:12), Max: 98.5 (15 Juan 2021 20:54)  HR: 63 (16 Jun 2021 05:12) (63 - 79)  BP: 108/56 (16 Jun 2021 05:12) (108/56 - 114/65)  BP(mean): --  RR: 18 (16 Jun 2021 05:12) (18 - 19)  SpO2: 94% (16 Jun 2021 05:12) (93% - 94%)      PHYSICAL EXAM:  Lungs: CTA bilat without W/R/R  Card: S1S2  Abd: Steri-strips over incisions C/D/I.  Soft, NT, ND.  +BS x 4.  No rebound/guarding.    Ext: Calves soft, NT, without edema bilat    LABS:                        12.2   5.85  )-----------( 193      ( 15 Juan 2021 06:56 )             36.3     06-15    142  |  111<H>  |  6<L>  ----------------------------<  111<H>  4.4   |  27  |  0.63    Ca    8.7      15 Juan 2021 06:56  Phos  1.9     06-15  Mg     2.2     06-15            A:  70 yo female s/p ex-lap with lysis of abdominal adhesions for sbo    P:  Patient doing very well clinically with full return of bowel function and stable/reassuring VS  No leukocytosis, remains afebrile   For D/C to home today  Follow-up with Dr. Barragan in his office in 2 weeks

## 2021-06-16 NOTE — PROGRESS NOTE ADULT - SUBJECTIVE AND OBJECTIVE BOX
Patient is a 71y old  Female who presents with a chief complaint of Abdominal pain, closed loop SBO seen on CT scan (15 Juan 2021 12:40)      INTERVAL HPI/OVERNIGHT EVENTS: No acute overnight events. Patient seen and examined at bedside this morning. Endorses feeling well, but has not yet had BM. Passing good amount of flatus. Denies CP, SOB, abdominal pain. After exam, Informed by RN patient subsequently had BM.    MEDICATIONS  (STANDING):  atorvastatin 40 milliGRAM(s) Oral at bedtime  heparin   Injectable 5000 Unit(s) SubCutaneous every 12 hours  melatonin 5 milliGRAM(s) Oral at bedtime  metoprolol succinate ER 25 milliGRAM(s) Oral daily    MEDICATIONS  (PRN):  acetaminophen   Tablet .. 650 milliGRAM(s) Oral every 6 hours PRN Mild Pain (1 - 3)  oxycodone    5 mG/acetaminophen 325 mG 1 Tablet(s) Oral every 4 hours PRN Moderate Pain (4 - 6)      Allergies    tetracycline (Hives)    Intolerances        REVIEW OF SYSTEMS:  CONSTITUTIONAL: No fever or chills  HEENT:  No headache, no sore throat  RESPIRATORY: No cough, wheezing, or shortness of breath  CARDIOVASCULAR: No chest pain, palpitations, or leg swelling  GASTROINTESTINAL: No nausea, vomiting, or diarrhea  GENITOURINARY: No dysuria, frequency, or hematuria  NEUROLOGICAL: no focal weakness or dizziness  MUSCULOSKELETAL: no myalgias       Vital Signs Last 24 Hrs  T(C): 36.9 (16 Jun 2021 05:12), Max: 36.9 (15 Juan 2021 20:54)  T(F): 98.4 (16 Jun 2021 05:12), Max: 98.5 (15 Juan 2021 20:54)  HR: 63 (16 Jun 2021 05:12) (63 - 79)  BP: 108/56 (16 Jun 2021 05:12) (108/56 - 114/65)  BP(mean): --  RR: 18 (16 Jun 2021 05:12) (18 - 19)  SpO2: 94% (16 Jun 2021 05:12) (93% - 94%)    PHYSICAL EXAM:  GENERAL: NAD  HEENT:  EOMI, moist mucous membranes  CHEST/LUNG:  CTA b/l, no rales, wheezes, or rhonchi  HEART:  RRR, S1, S2, no murmur  ABDOMEN:  BS+, soft, nontender, nondistended. Steri-strips in place at surgical sites, C/D/I  EXTREMITIES: no edema or calf tenderness  SKIN:  Warm, dry  NERVOUS SYSTEM: AA&Ox3    LABS:    CBC Full  -  ( 15 Juan 2021 06:56 )  WBC Count : 5.85 K/uL  Hemoglobin : 12.2 g/dL  Hematocrit : 36.3 %  Platelet Count - Automated : 193 K/uL  Mean Cell Volume : 93.1 fl  Mean Cell Hemoglobin : 31.3 pg  Mean Cell Hemoglobin Concentration : 33.6 gm/dL  Auto Neutrophil # : x  Auto Lymphocyte # : x  Auto Monocyte # : x  Auto Eosinophil # : x  Auto Basophil # : x  Auto Neutrophil % : x  Auto Lymphocyte % : x  Auto Monocyte % : x  Auto Eosinophil % : x  Auto Basophil % : x      Ca    8.7        15 Juan 2021 06:56          CAPILLARY BLOOD GLUCOSE              RADIOLOGY & ADDITIONAL TESTS: No additional imaging    Personally reviewed.     Consultant(s) Notes Reviewed:  [x] YES  [ ] NO    Care Discussed with [x] Consultants  [x] Patient  [ ] Family  [ ]      [ ] Other; RN  DVT ppx

## 2021-06-16 NOTE — PROGRESS NOTE ADULT - PROBLEM SELECTOR PLAN 1
Acute on chronic, POD #3 s/p ex-lap with lysis of intermesenteric adhesions  - Primary care per surgery, Dr. Israel   - NGT d/c'ed, patient diet advanced clear liquids -> now tolerating low fiber PO diet   - Pain is well controlled, would continue current regimen with Tylenol PRN mild, Percocet 5mg PRN moderate  - Encouraged incentive spirometry use   - Discharge planning per surgery when patient has BM, she is passing flatus at this time
- Will discuss with Dr. Israel  - NGT to remain to suction for now, still with high output however flatus is encouraging  - ambulation encouraged  - incentive spirometer  - pain control  - NPO, IVF   - cepacol and hurricane spray ordered for throat discomfort  - serial abdominal exams  - labs in am    Surgical Team Contact Information  Spectralink: Ext: [phone] or [phone]  Pager: [phone].
PLAN  - Will discuss with Dr. Israel  - NGT to remain to suction for now  - contreras to remain in this AM, pt making borderline amount of urine post op   - ambulation encouraged  - incentive spirometer  - pain control  - OOB  - NPO, IVF   - cepacol and hurricane spray ordered for throat discomfort  - serial abdominal exams  - labs in am    Surgical Team Contact Information  Spectralink: Ext: [phone] or [phone]  Pager: [phone]
Acute on chronic, POD #4 s/p ex-lap with lysis of intermesenteric adhesions  - Primary care per surgery, Dr. Israel   - Pt tolerating low fiber PO diet   - Pain is well controlled, continue current regimen with Tylenol PRN mild, Percocet 5mg PRN moderate  - Encouraged incentive spirometry use   - Pt with BM this morning, discharge planning per surgery

## 2021-06-16 NOTE — PROGRESS NOTE ADULT - REASON FOR ADMISSION
Abdominal pain, closed loop SBO seen on CT scan

## 2021-06-16 NOTE — PROGRESS NOTE ADULT - PROVIDER SPECIALTY LIST ADULT
Cardiology
Hospitalist
Surgery
Cardiology
Cardiology
Surgery
Surgery
Cardiology
Surgery
Hospitalist

## 2021-06-16 NOTE — PROGRESS NOTE ADULT - PROBLEM SELECTOR PLAN 4
Patient states she does not desire nicotine patch at this time  - Patient demonstrated clear understanding of risks of continued tobacco use, agreed to engage in further discussions

## 2021-06-16 NOTE — DISCHARGE NOTE NURSING/CASE MANAGEMENT/SOCIAL WORK - PATIENT PORTAL LINK FT
You can access the FollowMyHealth Patient Portal offered by [facility] by registering at the following website: [email or web address]. By joining [employer]’s FollowMyHealth portal, you will also be able to view your health information using other applications (apps) compatible with our system.

## 2021-06-16 NOTE — PROGRESS NOTE ADULT - ATTENDING COMMENTS
Patient seen and examined with surgical PA.  She states that she is feeling well, has been tolerating clear liquids, and is hungry for solid food.  She reports continued flatus, however has not yet had a bowel movement.  Her abdomen is soft, minimally distended, and non-tender.  Her incisions are clean with intact steri-strips.  Will advance to low fiber diet.  Discharge planning once she is tolerating a diet and has had a bowel movement.
I personally saw and examined the patient in detail.  I have spoken to the above provider regarding the assessment and plan of care.  I reviewed the above assessment and plan of care, and agree.  I have made changes in the body of the note where appropriate.
Patient seen and examined with surgical PA.  She states that she is feeling well, has been tolerating clear liquids, and is hungry for solid food.  She reports continued flatus, however has not yet had a bowel movement.  Her abdomen is soft, minimally distended, and non-tender.  Her incisions are clean with intact steri-strips.  Will advance to low fiber diet.  Discharge planning once she is tolerating a diet and has had a bowel movement.
-there is no evidence of acute ischemia.  -there is no evidence of significant arrhythmia.  -there is no evidence for meaningful  volume overload.   -bb to po
Chart reviewed    Patient seen and examined    Agree with plan as outlined above    71 F w hx of SBO, PSVT with multiple AV pathways, repeated failed ablations, s/p AVJ ablation, s/p PPM with lead fracture in 2010 s/p extraction, recent MDT gen change in 12/2020 presents with abd pain, now s/p diagnostic laparoscopy with ex lap and JOYCE.     SBO  - S/p ex lap with lysis of intermesenteric adhesions. Tolerated procedure well with no obvious cardiac complications  - Pain control  - Encourage incentive spirometer  - NGT to LWS per surgery.  Monitor electrolytes, replete to keep K>4 and Mag>2   - Care per Surgery  - Encourage to increase activity and ambulate as tolerated
I saw and examined the patient personally. Spoke with above provider regarding this case. I reviewed the above findings completely.  I agree with the above history, physical, and plan which I have edited where appropriate.     tolerated procedure well from cv pov. cont iv lopressor for bp. can hold if becomes hypotensive. pain control. incentive lissette
Patient seen and examined.  She states that she is feeling well, is hungry, and has been passing a good amount of flatus.  She has not had a biwel movement.  The NG output is thin dark brown/blood tinged with coffee grounds.  Her abdomen is soft, less distended, and non-tender.  The incisions are clean with steri-strips.  NG tube clamp trial.  Okay to remove tube and advance to clear liquids if tolerates and residuals < 150 mL.
Patient seen and examined.  She reports discomfort around the NG tube, however states that her abdomin pain is adequately controlled.  The NG output is thin brown/blood tinged with coffee grounds.  Her abdomen is soft, mildly distended, with appropriate neeraj-incisional tenderness.  The dressings are clean and intact.  Continue NPO with NG tube while awaiting return of bowel function.  Will add Protonix in light of coffee ground/blood tinged NG output.
70 yo F with pmh sbo, MVP, PPM, AV deepa ablation 1997, HLD who p/w abdominal pain that had been going on for a ople of months and ct abd iv po contrast as outpatient which showed low grade close loop obstruction, admitted with SBO requiring urgent surgical intervention, now POD #4 s/p ex-lap with lysis of intermesenteric adhesions, now with return of bowel function, surgery discharging today.
Patient seen and examined at bedside. POD#3 s/p ex lap and lysis of intermesenteric adhesions which were causing SBO. States she has been feeling better. Denies any abdominal pain, states she is passing flatus but has not had BM yet. Tolerating PO diet. Resume PO home meds. D/C planning as per Surgical team.

## 2021-06-16 NOTE — PROGRESS NOTE ADULT - PROBLEM SELECTOR PLAN 2
Chronic, s/p ablation and PPM 1997  - Rate controlled  - Continue PO Toprol-XL 25mg qd   - Continue ASA 81mg qd   - Cardiology, [facility], following - recs noted
Chronic, s/p ablation and PPM 1997  - Rate controlled  - Would switch IV metoprolol to PO Toprol-XL 25mg qd   - Continue ASA 81mg qd   - Cardiology, [facility], following - recs noted

## 2021-06-16 NOTE — PROGRESS NOTE ADULT - SUBJECTIVE AND OBJECTIVE BOX
Health system Cardiology Consultants -- Junie Sellers, Puhspa, Mario, Toni Pittman, Don Dennison: Office # [phone]    Follow Up: cardiac optimization pre/post procedure       Subjective/Observations: Patient seen and examined. Patient awake, alert, resting in bed. No complaints of chest pain, dyspnea, palpitations or dizziness. No signs of orthopnea or PND. Abdominal discomfort improved. Tolerating room air. Tolerating diet.     REVIEW OF SYSTEMS: All review of systems is negative for eye, ENT, GI, , allergic, dermatologic, musculoskeletal and neurologic except as described above    PAST MEDICAL & SURGICAL HISTORY:  Personal History of Mitral Valve Disease  mitral valve prolapse    s/p AV (Atrioventricular) Roseann Ablation    SVT (supraventricular tachycardia)    SBO (small bowel obstruction)    Cardiac Pacemaker  MDT dual chamber PPM , lead fx w/ system replacement     H/O  section  x1    S/P cataract surgery  Right    MEDICATIONS  (STANDING):  aspirin enteric coated 81 milliGRAM(s) Oral daily  atorvastatin 40 milliGRAM(s) Oral at bedtime  heparin   Injectable 5000 Unit(s) SubCutaneous every 12 hours  melatonin 5 milliGRAM(s) Oral at bedtime  metoprolol succinate ER 25 milliGRAM(s) Oral daily    MEDICATIONS  (PRN):  acetaminophen   Tablet .. 650 milliGRAM(s) Oral every 6 hours PRN Mild Pain (1 - 3)  oxycodone    5 mG/acetaminophen 325 mG 1 Tablet(s) Oral every 4 hours PRN Moderate Pain (4 - 6)    Allergies  tetracycline (Hives)    Vital Signs Last 24 Hrs  T(C): 36.9 (2021 05:12), Max: 36.9 (15 Juan 2021 20:54)  T(F): 98.4 (2021 05:12), Max: 98.5 (15 Juan 2021 20:54)  HR: 63 (2021 05:12) (63 - 79)  BP: 108/56 (2021 05:12) (108/56 - 114/65)  BP(mean): --  RR: 18 (2021 05:12) (18 - 19)  SpO2: 94% (2021 05:12) (93% - 94%)  I&O's Summary        TELE: Not on telemetry   PHYSICAL EXAM:  Appearance: NAD, no distress, alert, Well developed   HEENT: Moist Mucous Membranes, Anicteric  Cardiovascular: Regular rate and rhythm, Normal S1 S2, No JVD, No murmurs, No rubs, gallops or clicks  Respiratory: Non-labored, Clear to auscultation, No rales, No rhonchi, No wheezing.   Gastrointestinal:  Soft, Non-tender, + BS  Neurologic: Non-focal  Skin: Warm and dry, No visible rashes or ulcers, + abdomen incision, No ecchymosis, No cyanosis  Musculoskeletal: No clubbing, No cyanosis, No joint swelling/tenderness  Psychiatry: Mood & affect appropriate  Lymph: No peripheral edema.     LABS: All Labs Reviewed:                        12.2   5.85  )-----------( 193      ( 15 Juan 2021 06:56 )             36.3                         12.5   7.62  )-----------( 204      ( 2021 05:48 )             37.9     15 Juan 2021 06:56    142    |  111    |  6      ----------------------------<  111    4.4     |  27     |  0.63   2021 05:48    142    |  108    |  14     ----------------------------<  134    4.5     |  30     |  0.68     Ca    8.7        15 Juan 2021 06:56  Ca    8.2        2021 05:48  Phos  1.9       15 Juan 2021 06:56  Mg     2.2       15 Juan 2021 06:56      12 Lead ECG:   Ventricular Rate 69 BPM  Atrial Rate 69 BPM  QRS Duration 150 ms  Q-T Interval 452 ms  QTC Calculation(Bazett) 484 ms  P Axis 72 degrees  R Axis -59 degrees  T Axis -2 degrees  Diagnosis Line Ventricular-paced rhythm  Abnormal ECG  No previous ECGs available  Confirmed by FREDA RILEY ([id number]) on 2021 5:21:16 PM (21 @ 08:37)    < from: Transthoracic Echocardiogram w/ Doppler (05.12.10 @ 14:50) >  Dimensions:    Normal Values:  LA:     3.1    2.0 - 4.0 cm  Ao:     2.9    2.0 - 3.8 cm  SEPTUM: 0.7    0.6 - 1.2 cm  PWT:    0.7    0.6 - 1.1 cm  LVIDd:  5.0    3.0 - 5.6 cm  LVIDs:  3.4    1.8 - 4.0 cm  Derived variables:  LVMI: 70 g/m2  RWT: 0.28  Fractional short: 32 %  Ejection Fraction: 60 %  ------------------------------------------------------------------------  Observations:  Mitral Valve: Mitral annular calcification, otherwise  normal mitral valve.  Aortic Valve/Aorta: Normal trileaflet aortic valve.  Normal aortic root.  Left Atrium: Normal left atrium.  Left Ventricle: Normal left ventricular internal dimensions  and wall thicknesses.  Normal left ventricular systolic function. Mild diastolic  dysfunction (Stage I).  Right Heart: Normal right atrium.  Normal right ventricular size and systolic function.  Normal tricuspid and pulmonic valves.  Pericardium/Pleura: Normal pericardium with no pericardial  effusion.  Doppler:Mild mitral regurgitation.  Mild tricuspid regurgitation. Estimated pulmonary artery  systolic pressure equals 34 mm Hg, assuming right atrial  pressure equals 10  mm Hg.  ------------------------------------------------------------------------  Conclusions:  1. Mild mitral regurgitation.  2. Normal left ventricular systolic function. Mild  diastolic dysfunction (Stage I).  3. Normal right ventricular size and systolic function.  4. Mild tricuspid regurgitation. Estimated pulmonary artery  systolic pressure equals 34 mm Hg, assuming right atrial  pressure equals 10  mm Hg.  < end of copied text >

## 2021-06-16 NOTE — PROGRESS NOTE ADULT - ASSESSMENT
71 F w hx of SBO, PSVT with multiple AV pathways, repeated failed ablations, s/p AVJ ablation, s/p PPM with lead fracture in 2010 s/p extraction, recent MDT gen change in 12/2020 presents with abd pain, now s/p diagnostic laparoscopy with ex lap and JOYCE.     SBO  - S/p Ex lap with lysis of intermesenteric adhesions. Tolerated procedure well with no obvious cardiac complications  - Pain control  - Encourage incentive spirometer  - Care per Surgery  - Encourage to increase activity and ambulate as tolerated    pSVTs  - MDT PPM had gen change in 12/2020. No need for interrogation at this time.   - Continue Toprol 25mg     DVT ppx  - Per Primary    - Monitor and replete lytes, keep K>4, Mg>2.  - All other medical needs as per primary team.  - Other cardiovascular workup will depend on clinical course.  - Will continue to follow.    Laura Dowling MS FNP, [facility]  Nurse Practitioner- Cardiology   Spectra #[phone]
71 F w hx of SBO, PSVT with multiple AV pathways, repeated failed ablations, s/p AVJ ablation, s/p PPM with lead fracture in 2010 s/p extraction, recent MDT gen change in 12/2020 presents with abd pain.     SBO  - s/p exlap with lysis of intermesenteric adhesions.  Tolerated procedure well with no obvious cardiac complications  - Pain control  - Encourage incentive spirometer  - NGT still draining quite significantly.  Monitor electrolytes, replete to keep K>4 and Mag>2   - Care per Surgery  - Encourage to increase activity and ambulate as tolerated  - MDT PPM had gen change in 12/2020. No need for interrogation at this time.     DVT ppx  - Per Primary    - Further cardiac workup will depend on clinical course.   - Will continue to monitor    Catherine Fan DNP, NP-C  Cardiology   Spectra #[phone]    
ASSESSMENT    71y Female POD 1 s/p diagnostic laparoscopy with ex lap and JOYCE currently NPO with NGT in discomfort.
71 F w hx of SBO, PSVT with multiple AV pathways, repeated failed ablations, s/p AVJ ablation, s/p PPM with lead fracture in 2010 s/p extraction, recent MDT gen change in 12/2020 presents with abd pain, now s/p diagnostic laparoscopy with ex lap and JOYCE.     SBO  - S/p ex lap with lysis of intermesenteric adhesions. Tolerated procedure well with no obvious cardiac complications  - Pain control  - Encourage incentive spirometer  - Care per Surgery  - Encourage to increase activity and ambulate as tolerated    pSVTs  - MDT PPM had gen change in 12/2020. No need for interrogation at this time.   - Continue BB IV, can switch to PO when tolerates PO. On Toprol 25 at home.     DVT ppx  - Per Primary    - Monitor and replete lytes, keep K>4, Mg>2.  - All other medical needs as per primary team.  - Other cardiovascular workup will depend on clinical course.  - Will continue to follow.    Laura Dowling, MS FNP, AGAP  Nurse Practitioner- Cardiology   Spectra #[phone]
71 F w hx of SBO, PSVT with multiple AV pathways, repeated failed ablations, s/p AVJ ablation, s/p PPM with lead fracture in 2010 s/p extraction, recent MDT gen change in 12/2020 presents with abd pain, now s/p diagnostic laparoscopy with ex lap and JOYCE.     SBO  - S/p ex lap with lysis of intermesenteric adhesions. Tolerated procedure well with no obvious cardiac complications  - Pain control  - Encourage incentive spirometer  - NGT to LWS per surgery.  Monitor electrolytes, replete to keep K>4 and Mag>2   - Care per Surgery  - Encourage to increase activity and ambulate as tolerated    pSVTs  - MDT PPM had gen change in 12/2020. No need for interrogation at this time.   - Continue BB IV, can switch to PO when tolerates PO intake. On Toprol 25 at home.     DVT ppx  - Per Primary    - Monitor and replete lytes, keep K>4, Mg>2.  - All other medical needs as per primary team.  - Other cardiovascular workup will depend on clinical course.  - Will continue to follow.    Laura Dowling, MS FNP, AGAP  Nurse Practitioner- Cardiology   Spectra #[phone]
71y Female POD 2 s/p diagnostic laparoscopy with ex lap and JOYCE currently NPO with NGT in discomfort passing flatus.
72 yo F with pmh sbo, MVP, PPM, AV deepa ablation 1997, HLD who p/w abdominal pain that had been going on for a ople of months and ct abd iv po contrast as outpatient which showed low grade close loop obstruction, admitted with SBO requiring urgent surgical intervention, now POD #4 s/p ex-lap with lysis of intermesenteric adhesions
72 yo F with pmh sbo, MVP, PPM, AV deepa ablation 1997, HLD who p/w abdominal pain that had been going on for a ople of months and ct abd iv po contrast as outpatient which showed low grade close loop obstruction, admitted with SBO requiring urgent surgical intervention, now POD #3 s/p ex-lap with lysis of intermesenteric adhesions

## 2021-06-24 PROBLEM — K56.609 UNSPECIFIED INTESTINAL OBSTRUCTION, UNSPECIFIED AS TO PARTIAL VERSUS COMPLETE OBSTRUCTION: Chronic | Status: ACTIVE | Noted: 2021-06-12

## 2021-06-26 DIAGNOSIS — Z87.19 PERSONAL HISTORY OF OTHER DISEASES OF THE DIGESTIVE SYSTEM: ICD-10-CM

## 2021-06-28 ENCOUNTER — NON-APPOINTMENT (OUTPATIENT)
Age: 71
End: 2021-06-28

## 2021-07-01 ENCOUNTER — APPOINTMENT (OUTPATIENT)
Dept: SURGERY | Facility: CLINIC | Age: 71
End: 2021-07-01

## 2021-11-23 ENCOUNTER — APPOINTMENT (OUTPATIENT)
Dept: GASTROENTEROLOGY | Facility: HOSPITAL | Age: 71
End: 2021-11-23

## 2023-02-13 NOTE — ED PROVIDER NOTE - NEUROLOGICAL, MLM
Alert and oriented, no focal deficits, no motor or sensory deficits. Griseofulvin Counseling:  I discussed with the patient the risks of griseofulvin including but not limited to photosensitivity, cytopenia, liver damage, nausea/vomiting and severe allergy.  The patient understands that this medication is best absorbed when taken with a fatty meal (e.g., ice cream or french fries).

## 2023-04-20 ENCOUNTER — APPOINTMENT (OUTPATIENT)
Dept: ORTHOPEDIC SURGERY | Facility: CLINIC | Age: 73
End: 2023-04-20
Payer: MEDICARE

## 2023-04-20 VITALS — BODY MASS INDEX: 23.75 KG/M2 | WEIGHT: 121 LBS | HEIGHT: 60 IN

## 2023-04-20 DIAGNOSIS — M54.6 PAIN IN THORACIC SPINE: ICD-10-CM

## 2023-04-20 DIAGNOSIS — M47.24 OTHER SPONDYLOSIS WITH RADICULOPATHY, THORACIC REGION: ICD-10-CM

## 2023-04-20 PROCEDURE — 72070 X-RAY EXAM THORAC SPINE 2VWS: CPT

## 2023-04-20 PROCEDURE — 99213 OFFICE O/P EST LOW 20 MIN: CPT

## 2023-04-20 PROCEDURE — 99203 OFFICE O/P NEW LOW 30 MIN: CPT

## 2023-04-20 RX ORDER — METHOCARBAMOL 750 MG/1
750 TABLET, FILM COATED ORAL 3 TIMES DAILY
Qty: 60 | Refills: 1 | Status: ACTIVE | COMMUNITY
Start: 2023-04-20 | End: 1900-01-01

## 2023-04-20 NOTE — PHYSICAL EXAM
[Flexion] : flexion [] : achilles and patella reflexes are symmetrical [Bilateral] : rib bilaterally [FreeTextEntry1] : mild scoliosis and ddd

## 2023-04-20 NOTE — HISTORY OF PRESENT ILLNESS
[Mid-back] : mid-back [8] : 8 [5] : 5 [Dull/Aching] : dull/aching [Sharp] : sharp [Intermittent] : intermittent [Nothing helps with pain getting better] : Nothing helps with pain getting better [de-identified] : Has longstanding upper back issues, but having spasms in back for past week. No injury. Pain worse with bending, standing over the sink. No radiation of pain. h/o pacemaker [] : no [FreeTextEntry5] : started years ago. PT has pain in her mid back which wraps around to her sides. Pt has pain when doing house work and gets worse with more activity

## 2023-08-01 ENCOUNTER — EMERGENCY (EMERGENCY)
Facility: HOSPITAL | Age: 73
LOS: 1 days | Discharge: HOME | End: 2023-08-01
Attending: EMERGENCY MEDICINE | Admitting: EMERGENCY MEDICINE
Payer: MEDICARE

## 2023-08-01 ENCOUNTER — APPOINTMENT (OUTPATIENT)
Dept: ORTHOPEDIC SURGERY | Facility: CLINIC | Age: 73
End: 2023-08-01

## 2023-08-01 VITALS
HEART RATE: 59 BPM | TEMPERATURE: 98 F | RESPIRATION RATE: 18 BRPM | OXYGEN SATURATION: 100 % | DIASTOLIC BLOOD PRESSURE: 70 MMHG | SYSTOLIC BLOOD PRESSURE: 120 MMHG

## 2023-08-01 VITALS
WEIGHT: 123.9 LBS | TEMPERATURE: 99 F | SYSTOLIC BLOOD PRESSURE: 123 MMHG | OXYGEN SATURATION: 99 % | RESPIRATION RATE: 18 BRPM | DIASTOLIC BLOOD PRESSURE: 75 MMHG | HEIGHT: 60 IN | HEART RATE: 68 BPM

## 2023-08-01 DIAGNOSIS — Z98.891 HISTORY OF UTERINE SCAR FROM PREVIOUS SURGERY: Chronic | ICD-10-CM

## 2023-08-01 DIAGNOSIS — Z98.49 CATARACT EXTRACTION STATUS, UNSPECIFIED EYE: Chronic | ICD-10-CM

## 2023-08-01 LAB
ALBUMIN SERPL ELPH-MCNC: 3.5 G/DL — SIGNIFICANT CHANGE UP (ref 3.3–5)
ALP SERPL-CCNC: 80 U/L — SIGNIFICANT CHANGE UP (ref 40–120)
ALT FLD-CCNC: 28 U/L — SIGNIFICANT CHANGE UP (ref 12–78)
ANION GAP SERPL CALC-SCNC: 3 MMOL/L — LOW (ref 5–17)
APTT BLD: 27.2 SEC — SIGNIFICANT CHANGE UP (ref 24.5–35.6)
AST SERPL-CCNC: 33 U/L — SIGNIFICANT CHANGE UP (ref 15–37)
BASOPHILS # BLD AUTO: 0.04 K/UL — SIGNIFICANT CHANGE UP (ref 0–0.2)
BASOPHILS NFR BLD AUTO: 0.7 % — SIGNIFICANT CHANGE UP (ref 0–2)
BILIRUB SERPL-MCNC: 0.2 MG/DL — SIGNIFICANT CHANGE UP (ref 0.2–1.2)
BUN SERPL-MCNC: 27 MG/DL — HIGH (ref 7–23)
CALCIUM SERPL-MCNC: 8.6 MG/DL — SIGNIFICANT CHANGE UP (ref 8.5–10.1)
CHLORIDE SERPL-SCNC: 110 MMOL/L — HIGH (ref 96–108)
CO2 SERPL-SCNC: 26 MMOL/L — SIGNIFICANT CHANGE UP (ref 22–31)
CREAT SERPL-MCNC: 0.96 MG/DL — SIGNIFICANT CHANGE UP (ref 0.5–1.3)
EGFR: 63 ML/MIN/1.73M2 — SIGNIFICANT CHANGE UP
EOSINOPHIL # BLD AUTO: 0.3 K/UL — SIGNIFICANT CHANGE UP (ref 0–0.5)
EOSINOPHIL NFR BLD AUTO: 5 % — SIGNIFICANT CHANGE UP (ref 0–6)
GLUCOSE SERPL-MCNC: 98 MG/DL — SIGNIFICANT CHANGE UP (ref 70–99)
HCT VFR BLD CALC: 38.5 % — SIGNIFICANT CHANGE UP (ref 34.5–45)
HGB BLD-MCNC: 12.9 G/DL — SIGNIFICANT CHANGE UP (ref 11.5–15.5)
IMM GRANULOCYTES NFR BLD AUTO: 0.2 % — SIGNIFICANT CHANGE UP (ref 0–0.9)
INR BLD: 0.93 RATIO — SIGNIFICANT CHANGE UP (ref 0.85–1.18)
LYMPHOCYTES # BLD AUTO: 2.03 K/UL — SIGNIFICANT CHANGE UP (ref 1–3.3)
LYMPHOCYTES # BLD AUTO: 33.8 % — SIGNIFICANT CHANGE UP (ref 13–44)
MCHC RBC-ENTMCNC: 31.7 PG — SIGNIFICANT CHANGE UP (ref 27–34)
MCHC RBC-ENTMCNC: 33.5 GM/DL — SIGNIFICANT CHANGE UP (ref 32–36)
MCV RBC AUTO: 94.6 FL — SIGNIFICANT CHANGE UP (ref 80–100)
MONOCYTES # BLD AUTO: 0.57 K/UL — SIGNIFICANT CHANGE UP (ref 0–0.9)
MONOCYTES NFR BLD AUTO: 9.5 % — SIGNIFICANT CHANGE UP (ref 2–14)
NEUTROPHILS # BLD AUTO: 3.06 K/UL — SIGNIFICANT CHANGE UP (ref 1.8–7.4)
NEUTROPHILS NFR BLD AUTO: 50.8 % — SIGNIFICANT CHANGE UP (ref 43–77)
NRBC # BLD: 0 /100 WBCS — SIGNIFICANT CHANGE UP (ref 0–0)
NT-PROBNP SERPL-SCNC: 283 PG/ML — HIGH (ref 0–125)
PLATELET # BLD AUTO: 206 K/UL — SIGNIFICANT CHANGE UP (ref 150–400)
POTASSIUM SERPL-MCNC: 4.4 MMOL/L — SIGNIFICANT CHANGE UP (ref 3.5–5.3)
POTASSIUM SERPL-SCNC: 4.4 MMOL/L — SIGNIFICANT CHANGE UP (ref 3.5–5.3)
PROT SERPL-MCNC: 6.7 G/DL — SIGNIFICANT CHANGE UP (ref 6–8.3)
PROTHROM AB SERPL-ACNC: 10.9 SEC — SIGNIFICANT CHANGE UP (ref 9.5–13)
RBC # BLD: 4.07 M/UL — SIGNIFICANT CHANGE UP (ref 3.8–5.2)
RBC # FLD: 12.4 % — SIGNIFICANT CHANGE UP (ref 10.3–14.5)
SODIUM SERPL-SCNC: 139 MMOL/L — SIGNIFICANT CHANGE UP (ref 135–145)
TROPONIN I, HIGH SENSITIVITY RESULT: 8.4 NG/L — SIGNIFICANT CHANGE UP
TROPONIN I, HIGH SENSITIVITY RESULT: 9.9 NG/L — SIGNIFICANT CHANGE UP
WBC # BLD: 6.01 K/UL — SIGNIFICANT CHANGE UP (ref 3.8–10.5)
WBC # FLD AUTO: 6.01 K/UL — SIGNIFICANT CHANGE UP (ref 3.8–10.5)

## 2023-08-01 PROCEDURE — 99285 EMERGENCY DEPT VISIT HI MDM: CPT

## 2023-08-01 PROCEDURE — 71045 X-RAY EXAM CHEST 1 VIEW: CPT

## 2023-08-01 PROCEDURE — 99285 EMERGENCY DEPT VISIT HI MDM: CPT | Mod: 25

## 2023-08-01 PROCEDURE — 85610 PROTHROMBIN TIME: CPT

## 2023-08-01 PROCEDURE — 93010 ELECTROCARDIOGRAM REPORT: CPT | Mod: 77

## 2023-08-01 PROCEDURE — 36415 COLL VENOUS BLD VENIPUNCTURE: CPT

## 2023-08-01 PROCEDURE — 84484 ASSAY OF TROPONIN QUANT: CPT

## 2023-08-01 PROCEDURE — 93280 PM DEVICE PROGR EVAL DUAL: CPT | Mod: 26

## 2023-08-01 PROCEDURE — 99214 OFFICE O/P EST MOD 30 MIN: CPT | Mod: 25

## 2023-08-01 PROCEDURE — 71045 X-RAY EXAM CHEST 1 VIEW: CPT | Mod: 26

## 2023-08-01 PROCEDURE — 85730 THROMBOPLASTIN TIME PARTIAL: CPT

## 2023-08-01 PROCEDURE — 80053 COMPREHEN METABOLIC PANEL: CPT

## 2023-08-01 PROCEDURE — 93005 ELECTROCARDIOGRAM TRACING: CPT

## 2023-08-01 PROCEDURE — 85025 COMPLETE CBC W/AUTO DIFF WBC: CPT

## 2023-08-01 PROCEDURE — 93010 ELECTROCARDIOGRAM REPORT: CPT

## 2023-08-01 PROCEDURE — 83880 ASSAY OF NATRIURETIC PEPTIDE: CPT

## 2023-08-01 NOTE — ED PROVIDER NOTE - PATIENT PORTAL LINK FT
You can access the FollowMyHealth Patient Portal offered by [facility] by registering at the following website: [email or web address]. By joining [employer]’s FollowMyHealth portal, you will also be able to view your health information using other applications (apps) compatible with our system.

## 2023-08-01 NOTE — ED PROVIDER NOTE - CONSTITUTIONAL, MLM
Diclofenac (Voltaren) 75 mg    Last office visit 5/7/20 (mwv).    Upcoming visit 5/8/20 (mwv).    Last refill 5/7/19.    Refills available at pharmacy, last filled for 1 year supply.  Additional refills will be addressed at upcoming office visit.   Well appearing, awake, alert, oriented to person, place, time/situation and in no apparent distress. normal...

## 2023-08-01 NOTE — CONSULT NOTE ADULT - ASSESSMENT
Assessment/Plan:  72 y/o F with Hx of SBO, PSVT with multiple AV pathways, repeated failed ablations, s/p AVJ ablation, s/p PPM with lead fracture in 2010 s/p extraction, recent MDT gen change in 12/2020 presented to the ED c/o palpitations that she felt on her throat associated with dizziness today while sitting on the couch    Palpitations/Cardiac Arrhythmia  - Has known Hx of PSVT s/p AVJ ablation.  s/p PPM and follows with Dr. Richey with recent interrogation (2 weeks ago)  - EKG showed 100& Vpaced.  Tele shows same with no obvious arrhythmias  - PPM ([employer]) interrogated at bedside.  F/U report  - Continue home Toprol XL 25 mg daily.  Would increase pending above interrogation report  - Follow up labs.  Obtain TSH  - Monitor electrolytes, replete to keep K>4 and <Mag>2    - No evidence of volume overload  - No anginal complaints  - If all w/u negative and interrogation reveals no significant events, she can be D/C'd from cardiac standpoint  - To follow with Dr. Richey this week  - If admitted, we will follow    Catherine Fan DNP, NP-C, AGACNP-C  Cardiology   Call TEAMS            Catherine Fan DNP, NP-C, AGACNP-C  Cardiology  Call TEAMS     Assessment/Plan:  72 y/o F with Hx of SBO, PSVT with multiple AV pathways, repeated failed ablations, s/p AVJ ablation, s/p PPM with lead fracture in 2010 s/p extraction, recent MDT gen change in 12/2020 presented to the ED c/o palpitations that she felt on her throat associated with dizziness today while sitting on the couch    Palpitations/Cardiac Arrhythmia  - Has known Hx of PSVT s/p AVJ ablation.  s/p PPM and follows with Dr. Richey with recent interrogation (2 weeks ago)  - EKG showed 100& Vpaced.  Tele shows same with no obvious arrhythmias  - PPM ([employer]) interrogated at bedside.  F/U report  - Continue home Toprol XL 25 mg daily.  Would increase pending above interrogation report  - Follow up labs.  Obtain TSH  - Monitor electrolytes, replete to keep K>4 and <Mag>2    - No evidence of volume overload  - No anginal complaints  - If all w/u negative and interrogation reveals no significant events, she can be D/C'd from cardiac standpoint  - To follow with Dr. Richey this week  - If admitted, we will follow    Addendum: Called Rep who confirmed PPM device is [employer].  Will come to reinterrogate it.  Discussed with ED physician, dr. Steve Fan DNP, NP-C, AGACNP-C  Cardiology   Call TEAMS

## 2023-08-01 NOTE — ED ADULT NURSE NOTE - NS TRANSFER PATIENT BELONGINGS
Clothing white metal bracelet,/Wrist Watch/Cell Phone/PDA (specify)/Jewelry/Money (specify)/Clothing

## 2023-08-01 NOTE — ED PROVIDER NOTE - OBJECTIVE STATEMENT
Patient is a 73-year-old lady with a past medical history of hyperlipidemia, pacemaker status post AV node ablation who presents to the ED because of an episode of palpitation.  Patient started feeling lightheaded as well, and found her heart rate to be 115.  Patient did not interrogation at home, and fax it to her cardiologist Dr. Breen, but did not get a reply so came to the ED.  Patient denies any chest pain, shortness of breath, abdominal pain, cough, fever.  Patient is asymptomatic currently, is comfortable, and laughing.

## 2023-08-01 NOTE — CONSULT NOTE ADULT - NS ATTEND AMEND GEN_ALL_CORE FT
Follow up interrogation report.  Suspect patient will be able to go home with outpatient [facility].

## 2023-08-01 NOTE — ED PROVIDER NOTE - CLINICAL SUMMARY MEDICAL DECISION MAKING FREE TEXT BOX
Ddx: Palpitations/ PM malfunction/ ro acs  Plan: Cbc, cmp, troponin, ecg, cxr, cardiology consult/ interrogation of PM, reassess

## 2023-08-01 NOTE — ED ADULT NURSE NOTE - NSFALLUNIVINTERV_ED_ALL_ED
Bed/Stretcher in lowest position, wheels locked, appropriate side rails in place/Call bell, personal items and telephone in reach/Instruct patient to call for assistance before getting out of bed/chair/stretcher/Non-slip footwear applied when patient is off stretcher/[place] to call system/Physically safe environment - no spills, clutter or unnecessary equipment/Purposeful proactive rounding/Room/bathroom lighting operational, light cord in reach

## 2023-08-01 NOTE — CHART NOTE - NSCHARTNOTEFT_GEN_A_CORE
PPM reinterrogated by [employer] Rep.  Report showed no events yesterday and today that would correlate with patient's symptoms.

## 2023-08-01 NOTE — CONSULT NOTE ADULT - SUBJECTIVE AND OBJECTIVE BOX
[facility] - Mario Zaman, Toni Pittman Savella, Cohen Don  Office Number: [phone]    Initial Consult Note    CHIEF COMPLAINT: Patient is a 73y old  Female who presents with a chief complaint of     HPI: This is a 72 y/o F with Hx of SBO, PSVT with multiple AV pathways, repeated failed ablations, s/p AVJ ablation, s/p PPM with lead fracture in  s/p extraction, recent MDT gen change in 2020 presented to the ED c/o palpitations that she felt on her throat associated with dizziness today while sitting on the couch.  Denies CP, SOB, DURAN, syncope.  Denies fever, chills, N/V/abdominal pain, diarrhea or any from of bleeding.  In the ED, her EKG showed Vpacing 100%, rate 70.  Tele showing Vpaced rhythm as well.  Of note, she follows Dr. Fournier whom she had seen last month with PPM interrogation that was done 2 weeks ago.    PAST MEDICAL & SURGICAL HISTORY:    Personal History of Mitral Valve Disease  mitral valve prolapse  s/p AV (Atrioventricular) Roseann Ablation  SVT (supraventricular tachycardia)  SBO (small bowel obstruction)  Cardiac Pacemaker  MDT dual chamber PPM , lead fx w/ system replacement   H/O  section  x1  S/P cataract surgery  Right    SOCIAL HISTORY:  No tobacco, ethanol, or drug abuse.  FAMILY HISTORY:  No pertinent family history in first degree relatives    No family history of acute MI or sudden cardiac death.  MEDICATIONS  (STANDING):    MEDICATIONS  (PRN):    Allergies    tetracycline (Hives)    Intolerances    REVIEW OF SYSTEMS:  CONSTITUTIONAL: No weakness, fevers or chills  EYES/ENT: No visual changes;  No vertigo or throat pain +dizziness  NECK: No pain or stiffness  RESPIRATORY: +cough, no wheezing, hemoptysis; No shortness of breath  CARDIOVASCULAR: No chest pain +palpitations  GASTROINTESTINAL: No abdominal pain. No nausea, vomiting, or hematemesis; No diarrhea or constipation. No melena or hematochezia.  GENITOURINARY: No dysuria, frequency or hematuria  NEUROLOGICAL: No numbness or weakness  SKIN: No itching or rash  All other review of systems is negative unless indicated above  VITAL SIGNS:   Vital Signs Last 24 Hrs  T(C): 37 (01 Aug 2023 15:20), Max: 37 (01 Aug 2023 15:20)  T(F): 98.6 (01 Aug 2023 15:20), Max: 98.6 (01 Aug 2023 15:20)  HR: 68 (01 Aug 2023 15:20) (68 - 68)  BP: 123/75 (01 Aug 2023 15:20) (123/75 - 123/75)  BP(mean): --  RR: 18 (01 Aug 2023 15:20) (18 - 18)  SpO2: 99% (01 Aug 2023 15:20) (99% - 99%)    Parameters below as of 01 Aug 2023 15:20  Patient On (Oxygen Delivery Method): room air    I&O's Summary    On Exam:  Constitutional: NAD, alert and oriented x 3  Lungs:  Non-labored, breath sounds are clear bilaterally, No wheezing, rales or rhonchi  Cardiovascular: RRR.  S1 and S2 positive.  No murmurs, rubs, gallops or clicks  Gastrointestinal: Bowel Sounds present, soft, nontender.   Lymph: No peripheral edema. No cervical lymphadenopathy.  Neurological: Alert, no focal deficits  Skin: No rashes or ulcers   Psych:  Mood & affect appropriate.    LABS: All Labs Reviewed:    RADIOLOGY:    PATIENT: RAMÓN LOUISE  : 1950   Age: 60 (F)   MR#: [id number]  STUDY DATE: 2010  LOCATION: [address] [place] [address]     Tape: Digital  SONOGRAPHER: Debbi Barton RDCS  STUDY QUALITY: Technically fair  REF. PHYSICIAN(S): YORDY Thomson MD  ------------------------------------------------------------------------  Procedure: Transthoracic echocardiogram with complete 2-D,  M-Mode and Doppler examination.  Diagnosis: Arrhythmia  ------------------------------------------------------------------------  Dimensions:    Normal Values:  LA:     3.1    2.0 - 4.0 cm  Ao:     2.9    2.0 - 3.8 cm  SEPTUM: 0.7    0.6 - 1.2 cm  PWT:    0.7    0.6 - 1.1 cm  LVIDd:  5.0    3.0 - 5.6 cm  LVIDs:  3.4    1.8 - 4.0 cm  Derived variables:  LVMI: 70 g/m2  RWT: 0.28  Fractional short: 32 %  Ejection Fraction: 60 %  ------------------------------------------------------------------------  Observations:  Mitral Valve: Mitral annular calcification, otherwise  normal mitral valve.  Aortic Valve/Aorta: Normal trileaflet aortic valve.  Normal aortic root.  Left Atrium: Normal left atrium.  Left Ventricle: Normal left ventricular internal dimensions  and wall thicknesses.  Normal left ventricular systolic function. Mild diastolic  dysfunction (Stage I).  Right Heart: Normal right atrium.  Normal right ventricular size and systolic function.  Normal tricuspid and pulmonic valves.  Pericardium/Pleura: Normal pericardium with no pericardial  effusion.  Doppler:Mild mitral regurgitation.  Mild tricuspid regurgitation. Estimated pulmonary artery  systolic pressure equals 34 mm Hg, assuming right atrial  pressure equals 10  mm Hg.  ------------------------------------------------------------------------  Conclusions:  1. Mild mitral regurgitation.  2. Normal left ventricular systolic function. Mild  diastolic dysfunction (Stage I).  3. Normal right ventricular size and systolic function.  4. Mild tricuspid regurgitation. Estimated pulmonary artery  systolic pressure equals 34 mm Hg, assuming right atrial  pressure equals 10  mm Hg.  ------------------------------------------------------------------------  Confirmed on  2010 - 17:25:48 by Mike Hernandez M.D.  ------------------------------------------------------------------------    EK% Vpaced    Assessment/Plan:  72 y/o F with Hx of SBO, PSVT with multiple AV pathways, repeated failed ablations, s/p AVJ ablation, s/p PPM with lead fracture in  s/p extraction, recent MDT gen change in 2020 presented to the ED c/o palpitations that she felt on her throat associated with dizziness today while sitting on the couch    Catherine Fan DNP, NP-C, AGACNP-C  Cardiology  Call TEAMS       [facility] - Mario Zaman, Toni Pittman Savella, Cohen Don  Office Number: [phone]    Initial Consult Note    CHIEF COMPLAINT: Patient is a 73y old  Female who presents with a chief complaint of     HPI: This is a 72 y/o F with Hx of SBO, PSVT with multiple AV pathways, repeated failed ablations, s/p AVJ ablation, s/p PPM with lead fracture in  s/p extraction, recent MDT gen change in 2020 presented to the ED c/o palpitations that she felt on her throat associated with dizziness today while sitting on the couch.  Denies CP, SOB, DURAN, syncope.  Denies fever, chills, N/V/abdominal pain, diarrhea or any from of bleeding.  In the ED, her EKG showed Vpacing 100%, rate 70.  Tele showing Vpaced rhythm as well.  Of note, she follows Dr. Richey whom she had seen last month with PPM interrogation that was done 2 weeks ago.    PAST MEDICAL & SURGICAL HISTORY:    Personal History of Mitral Valve Disease  mitral valve prolapse  s/p AV (Atrioventricular) Roseann Ablation  SVT (supraventricular tachycardia)  SBO (small bowel obstruction)  Cardiac Pacemaker  MDT dual chamber PPM , lead fx w/ system replacement   H/O  section  x1  S/P cataract surgery  Right    SOCIAL HISTORY:  No tobacco, ethanol, or drug abuse.  FAMILY HISTORY:  No pertinent family history in first degree relatives    No family history of acute MI or sudden cardiac death.  MEDICATIONS  (STANDING):    MEDICATIONS  (PRN):    Allergies    tetracycline (Hives)    Intolerances    REVIEW OF SYSTEMS:  CONSTITUTIONAL: No weakness, fevers or chills  EYES/ENT: No visual changes;  No vertigo or throat pain +dizziness  NECK: No pain or stiffness  RESPIRATORY: +cough, no wheezing, hemoptysis; No shortness of breath  CARDIOVASCULAR: No chest pain +palpitations  GASTROINTESTINAL: No abdominal pain. No nausea, vomiting, or hematemesis; No diarrhea or constipation. No melena or hematochezia.  GENITOURINARY: No dysuria, frequency or hematuria  NEUROLOGICAL: No numbness or weakness  SKIN: No itching or rash  All other review of systems is negative unless indicated above  VITAL SIGNS:   Vital Signs Last 24 Hrs  T(C): 37 (01 Aug 2023 15:20), Max: 37 (01 Aug 2023 15:20)  T(F): 98.6 (01 Aug 2023 15:20), Max: 98.6 (01 Aug 2023 15:20)  HR: 68 (01 Aug 2023 15:20) (68 - 68)  BP: 123/75 (01 Aug 2023 15:20) (123/75 - 123/75)  BP(mean): --  RR: 18 (01 Aug 2023 15:20) (18 - 18)  SpO2: 99% (01 Aug 2023 15:20) (99% - 99%)    Parameters below as of 01 Aug 2023 15:20  Patient On (Oxygen Delivery Method): room air    I&O's Summary    On Exam:  Constitutional: NAD, alert and oriented x 3  Lungs:  Non-labored, breath sounds are clear bilaterally, No wheezing, rales or rhonchi  Cardiovascular: RRR.  S1 and S2 positive.  No murmurs, rubs, gallops or clicks  Gastrointestinal: Bowel Sounds present, soft, nontender.   Lymph: No peripheral edema. No cervical lymphadenopathy.  Neurological: Alert, no focal deficits  Skin: No rashes or ulcers   Psych:  Mood & affect appropriate.    LABS: All Labs Reviewed:    RADIOLOGY:    PATIENT: RAMÓN LOUISE  : 1950   Age: 60 (F)   MR#: [id number]  STUDY DATE: 2010  LOCATION: [address] [place] [address]     Tape: Digital  SONOGRAPHER: Debbi Barton RDCS  STUDY QUALITY: Technically fair  REF. PHYSICIAN(S): YORDY Thomson MD  ------------------------------------------------------------------------  Procedure: Transthoracic echocardiogram with complete 2-D,  M-Mode and Doppler examination.  Diagnosis: Arrhythmia  ------------------------------------------------------------------------  Dimensions:    Normal Values:  LA:     3.1    2.0 - 4.0 cm  Ao:     2.9    2.0 - 3.8 cm  SEPTUM: 0.7    0.6 - 1.2 cm  PWT:    0.7    0.6 - 1.1 cm  LVIDd:  5.0    3.0 - 5.6 cm  LVIDs:  3.4    1.8 - 4.0 cm  Derived variables:  LVMI: 70 g/m2  RWT: 0.28  Fractional short: 32 %  Ejection Fraction: 60 %  ------------------------------------------------------------------------  Observations:  Mitral Valve: Mitral annular calcification, otherwise  normal mitral valve.  Aortic Valve/Aorta: Normal trileaflet aortic valve.  Normal aortic root.  Left Atrium: Normal left atrium.  Left Ventricle: Normal left ventricular internal dimensions  and wall thicknesses.  Normal left ventricular systolic function. Mild diastolic  dysfunction (Stage I).  Right Heart: Normal right atrium.  Normal right ventricular size and systolic function.  Normal tricuspid and pulmonic valves.  Pericardium/Pleura: Normal pericardium with no pericardial  effusion.  Doppler:Mild mitral regurgitation.  Mild tricuspid regurgitation. Estimated pulmonary artery  systolic pressure equals 34 mm Hg, assuming right atrial  pressure equals 10  mm Hg.  ------------------------------------------------------------------------  Conclusions:  1. Mild mitral regurgitation.  2. Normal left ventricular systolic function. Mild  diastolic dysfunction (Stage I).  3. Normal right ventricular size and systolic function.  4. Mild tricuspid regurgitation. Estimated pulmonary artery  systolic pressure equals 34 mm Hg, assuming right atrial  pressure equals 10  mm Hg.  ------------------------------------------------------------------------  Confirmed on  2010 - 17:25:48 by Mike Hernandez M.D.  ------------------------------------------------------------------------    EK% Vpaced

## 2023-08-01 NOTE — ED ADULT NURSE NOTE - OBJECTIVE STATEMENT
Pt presents to the ED via ambulance s/p palpitations, EKG done, pt placed on cardiac monitor, continuos pulse ox. Pt presents with #20 IV access on the left a/c, flushes with ease. Pt denies chest pain, dizziness.

## 2023-08-01 NOTE — ED PROVIDER NOTE - NS ED MD DISPO DISCHARGE
[facility]  ER called and spoke with MEEK Cottrell notified that pt is coming for evaluation for falls, syncopal episode yesterday and currently on xarelto.    Pt going by private car  
Home

## 2023-08-01 NOTE — ED PROVIDER NOTE - PROGRESS NOTE DETAILS
Pt has been asymptomatic in ED, had PM interrogated, and no events detected. Cleared for dc from cardiology Dr. Chavez, and will f/u w her cardiologist. Return precautions provided, ready for d/c.

## 2023-08-17 ENCOUNTER — APPOINTMENT (OUTPATIENT)
Dept: ORTHOPEDIC SURGERY | Facility: CLINIC | Age: 73
End: 2023-08-17
Payer: MEDICARE

## 2023-08-17 DIAGNOSIS — M54.2 CERVICALGIA: ICD-10-CM

## 2023-08-17 DIAGNOSIS — M47.22 OTHER SPONDYLOSIS WITH RADICULOPATHY, CERVICAL REGION: ICD-10-CM

## 2023-08-17 PROCEDURE — 72040 X-RAY EXAM NECK SPINE 2-3 VW: CPT

## 2023-08-17 PROCEDURE — 99214 OFFICE O/P EST MOD 30 MIN: CPT | Mod: 25

## 2023-08-17 PROCEDURE — 99204 OFFICE O/P NEW MOD 45 MIN: CPT | Mod: 25

## 2023-08-17 RX ORDER — TIZANIDINE 4 MG/1
4 TABLET ORAL
Qty: 60 | Refills: 0 | Status: ACTIVE | COMMUNITY
Start: 2023-08-17 | End: 1900-01-01

## 2023-08-17 NOTE — IMAGING
[Disc space narrowing] : Disc space narrowing [FreeTextEntry1] : multi level ddd with ant osteophyte formation noted

## 2023-08-17 NOTE — HISTORY OF PRESENT ILLNESS
[4] : 4 [2] : 2 [Tightness] : tightness [Intermittent] : intermittent [Leisure] : leisure [Social interactions] : social interactions [Meds] : meds [Sitting] : sitting [Exercising] : exercising [de-identified] : Simply woke up with right sided neck pain about 3 weeks ago. No pain down arm, no numbness. Did have headaches [] : Post Surgical Visit: no [FreeTextEntry1] : neck  [FreeTextEntry5] : She has had neck pain for the past month. She has been to Dr. Cartagena who prescribed neck exercises and medication. The neck was feeling better until 3 weeks ago, she states there was swelling. She also went to the ER as her BP spiked and she was worried it was related to the neck, they told her it was nothing serious.  [FreeTextEntry6] : She feels very stiff.  [FreeTextEntry7] : Head, gets bad headaches [FreeTextEntry9] : Robaxin  [de-identified] : Dr Cartagena

## 2023-08-17 NOTE — PHYSICAL EXAM
[Rotation to right] : rotation to right [] : normal deep tendon reflexes bilateral upper extremities

## 2023-09-28 ENCOUNTER — APPOINTMENT (OUTPATIENT)
Dept: ORTHOPEDIC SURGERY | Facility: CLINIC | Age: 73
End: 2023-09-28

## 2023-09-29 NOTE — ASU PATIENT PROFILE, ADULT - NS SC CAGE ALCOHOL GUILTY ABOUT
We placed a referral to the [facility] GI team regarding the recurrent diarrhea (C dif).    They will call you. I think it's a good idea to see them in case the Vancomycin isn't working.    Complete the Vancomycin the way it was prescribed- even if you're feeling 100% better!      
no

## 2023-12-27 ENCOUNTER — APPOINTMENT (OUTPATIENT)
Dept: ELECTROPHYSIOLOGY | Facility: CLINIC | Age: 73
End: 2023-12-27

## 2024-01-24 ENCOUNTER — APPOINTMENT (OUTPATIENT)
Dept: ELECTROPHYSIOLOGY | Facility: CLINIC | Age: 74
End: 2024-01-24

## 2024-01-26 NOTE — CARDIOLOGY SUMMARY
[___] : [unfilled] [de-identified] : TTE 12/20/23 LVEF 56%, LA 3.3cm, trace MR, trace TR, RVSP 27,  [de-identified] : 12/21/23 normal nuclear stress test. LVEF 73%

## 2024-01-26 NOTE — HISTORY OF PRESENT ILLNESS
[FreeTextEntry1] : 73 year old woman with history of SVT s/p remote AVJ ablation and PPM implant, s/p ppm generator replacement 12/11/2020, presenting for EP evaluation and evaluation of NSVT.   She initially had highly symptomatic SVT in 1996 and presented with severe palpitation and chest pain, and was found to have tachycardia up to 300 bpm. She was told she had "multiple pathways" and some close to the AV node, and had multiple attempts at ablation at [facility], but eventually required AV junction ablation and pacemaker implant in 1997. She did feel better after that. She has been pacemaker dependent since her AVJ ablation. She has a history of lead fracture resulting in symptomatic bradycardia and underwent lead extraction with new pacing system implant on 5/11/10 (Dr. Thomson, [facility]). She underwent uncomplicated ppm generator replacement 12/11/2020. She did have pain in her left arm after the procedure, which was thought to be orthopedic or due to her cervical radiculopathy.   Recently on device interrogation she has had episodes of NSVT up to 22 beats ( at 171 bpm).  TTE in 12/2023 noted LVEF 56% with normal LV function, and stress test was normal 12/2023.   Recent event monitor 11/21-12/4/23 revealed sinus rhythm  (avg 72, range  bpm), 8% APCs, and brief runs of PAT up to 9 beats.   She has recently generally been feeling well, with occasional palpitation which has been improved with metoprolol.  Interrogation of her MDT dual chamber ppm today reveals normal function in DDD 60 mode. She had 100% ventricular pacing, and 26% atrial pacing in DDD  mode. Overall AF burden has been <0.1%. Lead parameters are within normal limits (A 4076, implanted 5-11-10, threshold 0.375V@0.4ms, V:3830, implanted 5-11-10, threshold 1V@0.4ms).  Current meds include ASA 81 mg, metoprolol 25mg qd

## 2024-01-26 NOTE — DISCUSSION/SUMMARY
[FreeTextEntry1] : 73 year old woman with history of SVT s/p remote AVJ ablation and PPM implant, s/p ppm generator replacement 12/11/2020, presenting for EP evaluation.

## 2024-01-26 NOTE — PHYSICAL EXAM
[General Appearance - Well Developed] : well developed [Normal Appearance] : normal appearance [General Appearance - Well Nourished] : well nourished [Well Groomed] : well groomed [General Appearance - In No Acute Distress] : no acute distress [Normal Conjunctiva] : the conjunctiva exhibited no abnormalities [Normal Jugular Venous V Waves Present] : normal jugular venous V waves present [Normal Oropharynx] : normal oropharynx [Respiration, Rhythm And Depth] : normal respiratory rhythm and effort [Auscultation Breath Sounds / Voice Sounds] : lungs were clear to auscultation bilaterally [Heart Sounds] : normal S1 and S2 [Murmurs] : no murmurs present [Arterial Pulses Normal] : the arterial pulses were normal [Edema] : no peripheral edema present [Bowel Sounds] : normal bowel sounds [Abdomen Soft] : soft [Abnormal Walk] : normal gait [Nail Clubbing] : no clubbing of the fingernails [Skin Color & Pigmentation] : normal skin color and pigmentation [Skin Turgor] : normal skin turgor [Cyanosis, Localized] : no localized cyanosis [Oriented To Time, Place, And Person] : oriented to person, place, and time [] : no rash [Impaired Insight] : insight and judgment were intact [FreeTextEntry1] : left chest ppm implant site well healed

## 2024-01-26 NOTE — REASON FOR VISIT
[Arrhythmia/ECG Abnorrmalities] : arrhythmia/ECG abnormalities [FreeTextEntry3] : Dr Richey [FreeTextEntry1] : incomplete note pt seen in 2020

## 2024-04-21 NOTE — ASSESSMENT
[FreeTextEntry1] : Will give NSAID and Robaxin, start course of PT and see how she progresses
Bed/Stretcher in lowest position, wheels locked, appropriate side rails in place/Call bell, personal items and telephone in reach/Instruct patient to call for assistance before getting out of bed/chair/stretcher/Non-slip footwear applied when patient is off stretcher/[place] to call system/Physically safe environment - no spills, clutter or unnecessary equipment/Purposeful proactive rounding/Room/bathroom lighting operational, light cord in reach

## 2024-05-24 ENCOUNTER — APPOINTMENT (OUTPATIENT)
Dept: ORTHOPEDIC SURGERY | Facility: CLINIC | Age: 74
End: 2024-05-24

## 2024-06-11 ENCOUNTER — APPOINTMENT (OUTPATIENT)
Dept: ORTHOPEDIC SURGERY | Facility: CLINIC | Age: 74
End: 2024-06-11

## 2024-06-11 VITALS — BODY MASS INDEX: 25.52 KG/M2 | WEIGHT: 130 LBS | HEIGHT: 60 IN

## 2024-06-11 DIAGNOSIS — S30.0XXA CONTUSION OF LOWER BACK AND PELVIS, INITIAL ENCOUNTER: ICD-10-CM

## 2024-06-11 DIAGNOSIS — M79.18 MYALGIA, OTHER SITE: ICD-10-CM

## 2024-06-11 PROCEDURE — 73560 X-RAY EXAM OF KNEE 1 OR 2: CPT | Mod: 50

## 2024-06-11 PROCEDURE — 72100 X-RAY EXAM L-S SPINE 2/3 VWS: CPT

## 2024-06-11 PROCEDURE — 99213 OFFICE O/P EST LOW 20 MIN: CPT

## 2024-06-11 PROCEDURE — 99203 OFFICE O/P NEW LOW 30 MIN: CPT

## 2024-06-11 PROCEDURE — 73502 X-RAY EXAM HIP UNI 2-3 VIEWS: CPT

## 2024-06-11 NOTE — HISTORY OF PRESENT ILLNESS
[Lower back] : lower back [Sudden] : sudden [de-identified] : 74 year old F here for persistent lower back and left hip pain since she slipped and fell onto her buttocks 5/4/24 while ice skating.  She walks regularly for exercise and continues to have pain top of her hip.  Advil helps prn.  No numbness or tingling. Also c/o intermittent pain anterior knees. L>R.  No recent tx.   PMH: Pacemaker, elevated cholesterol. [] : no [FreeTextEntry1] : left hip  [FreeTextEntry3] : 4 weeks  [FreeTextEntry5] : patient fell while ice skating

## 2024-06-11 NOTE — IMAGING
[No bony abnormalities] : No bony abnormalities [AP] : anteroposterior [Left] : left knee [There are no fractures, subluxations or dislocations. No significant abnormalities are seen] : There are no fractures, subluxations or dislocations. No significant abnormalities are seen

## 2024-06-11 NOTE — PHYSICAL EXAM
[Left] : left hip [4___] : extension 4[unfilled]/5 [NL (0)] : extension 0 degrees [] : no groin pain with resisted straight leg raise [FreeTextEntry9] : to pelvic crest [TWNoteComboBox7] : flexion 120 degrees

## 2024-06-16 ENCOUNTER — EMERGENCY (EMERGENCY)
Facility: HOSPITAL | Age: 74
LOS: 1 days | Discharge: HOME | End: 2024-06-16
Attending: EMERGENCY MEDICINE | Admitting: EMERGENCY MEDICINE
Payer: MEDICARE

## 2024-06-16 VITALS
RESPIRATION RATE: 18 BRPM | SYSTOLIC BLOOD PRESSURE: 122 MMHG | OXYGEN SATURATION: 97 % | HEART RATE: 65 BPM | DIASTOLIC BLOOD PRESSURE: 65 MMHG | TEMPERATURE: 98 F

## 2024-06-16 VITALS
TEMPERATURE: 98 F | DIASTOLIC BLOOD PRESSURE: 79 MMHG | RESPIRATION RATE: 18 BRPM | SYSTOLIC BLOOD PRESSURE: 140 MMHG | HEIGHT: 60 IN | OXYGEN SATURATION: 98 % | HEART RATE: 64 BPM | WEIGHT: 130.07 LBS

## 2024-06-16 DIAGNOSIS — Z98.891 HISTORY OF UTERINE SCAR FROM PREVIOUS SURGERY: Chronic | ICD-10-CM

## 2024-06-16 DIAGNOSIS — Z98.49 CATARACT EXTRACTION STATUS, UNSPECIFIED EYE: Chronic | ICD-10-CM

## 2024-06-16 LAB
ALBUMIN SERPL ELPH-MCNC: 3.7 G/DL — SIGNIFICANT CHANGE UP (ref 3.3–5)
ALP SERPL-CCNC: 92 U/L — SIGNIFICANT CHANGE UP (ref 40–120)
ALT FLD-CCNC: 15 U/L — SIGNIFICANT CHANGE UP (ref 12–78)
ANION GAP SERPL CALC-SCNC: 4 MMOL/L — LOW (ref 5–17)
AST SERPL-CCNC: 25 U/L — SIGNIFICANT CHANGE UP (ref 15–37)
BASOPHILS # BLD AUTO: 0.04 K/UL — SIGNIFICANT CHANGE UP (ref 0–0.2)
BASOPHILS NFR BLD AUTO: 0.7 % — SIGNIFICANT CHANGE UP (ref 0–2)
BILIRUB SERPL-MCNC: 0.3 MG/DL — SIGNIFICANT CHANGE UP (ref 0.2–1.2)
BUN SERPL-MCNC: 21 MG/DL — SIGNIFICANT CHANGE UP (ref 7–23)
CALCIUM SERPL-MCNC: 9.2 MG/DL — SIGNIFICANT CHANGE UP (ref 8.5–10.1)
CHLORIDE SERPL-SCNC: 109 MMOL/L — HIGH (ref 96–108)
CO2 SERPL-SCNC: 27 MMOL/L — SIGNIFICANT CHANGE UP (ref 22–31)
CREAT SERPL-MCNC: 0.81 MG/DL — SIGNIFICANT CHANGE UP (ref 0.5–1.3)
D DIMER BLD IA.RAPID-MCNC: 459 NG/ML DDU — HIGH
EGFR: 77 ML/MIN/1.73M2 — SIGNIFICANT CHANGE UP
EOSINOPHIL # BLD AUTO: 0.13 K/UL — SIGNIFICANT CHANGE UP (ref 0–0.5)
EOSINOPHIL NFR BLD AUTO: 2.4 % — SIGNIFICANT CHANGE UP (ref 0–6)
FLUAV AG NPH QL: SIGNIFICANT CHANGE UP
FLUBV AG NPH QL: SIGNIFICANT CHANGE UP
GLUCOSE SERPL-MCNC: 132 MG/DL — HIGH (ref 70–99)
HCT VFR BLD CALC: 41.2 % — SIGNIFICANT CHANGE UP (ref 34.5–45)
HGB BLD-MCNC: 13.6 G/DL — SIGNIFICANT CHANGE UP (ref 11.5–15.5)
IMM GRANULOCYTES NFR BLD AUTO: 0.4 % — SIGNIFICANT CHANGE UP (ref 0–0.9)
LYMPHOCYTES # BLD AUTO: 1.31 K/UL — SIGNIFICANT CHANGE UP (ref 1–3.3)
LYMPHOCYTES # BLD AUTO: 23.9 % — SIGNIFICANT CHANGE UP (ref 13–44)
MCHC RBC-ENTMCNC: 31.4 PG — SIGNIFICANT CHANGE UP (ref 27–34)
MCHC RBC-ENTMCNC: 33 GM/DL — SIGNIFICANT CHANGE UP (ref 32–36)
MCV RBC AUTO: 95.2 FL — SIGNIFICANT CHANGE UP (ref 80–100)
MONOCYTES # BLD AUTO: 0.32 K/UL — SIGNIFICANT CHANGE UP (ref 0–0.9)
MONOCYTES NFR BLD AUTO: 5.9 % — SIGNIFICANT CHANGE UP (ref 2–14)
NEUTROPHILS # BLD AUTO: 3.65 K/UL — SIGNIFICANT CHANGE UP (ref 1.8–7.4)
NEUTROPHILS NFR BLD AUTO: 66.7 % — SIGNIFICANT CHANGE UP (ref 43–77)
NRBC # BLD: 0 /100 WBCS — SIGNIFICANT CHANGE UP (ref 0–0)
NT-PROBNP SERPL-SCNC: 547 PG/ML — HIGH (ref 0–125)
PLATELET # BLD AUTO: 223 K/UL — SIGNIFICANT CHANGE UP (ref 150–400)
POTASSIUM SERPL-MCNC: 4.2 MMOL/L — SIGNIFICANT CHANGE UP (ref 3.5–5.3)
POTASSIUM SERPL-SCNC: 4.2 MMOL/L — SIGNIFICANT CHANGE UP (ref 3.5–5.3)
PROT SERPL-MCNC: 7.5 G/DL — SIGNIFICANT CHANGE UP (ref 6–8.3)
RBC # BLD: 4.33 M/UL — SIGNIFICANT CHANGE UP (ref 3.8–5.2)
RBC # FLD: 12.4 % — SIGNIFICANT CHANGE UP (ref 10.3–14.5)
RSV RNA NPH QL NAA+NON-PROBE: SIGNIFICANT CHANGE UP
SARS-COV-2 RNA SPEC QL NAA+PROBE: SIGNIFICANT CHANGE UP
SODIUM SERPL-SCNC: 140 MMOL/L — SIGNIFICANT CHANGE UP (ref 135–145)
TROPONIN I, HIGH SENSITIVITY RESULT: 5.5 NG/L — SIGNIFICANT CHANGE UP
WBC # BLD: 5.47 K/UL — SIGNIFICANT CHANGE UP (ref 3.8–10.5)
WBC # FLD AUTO: 5.47 K/UL — SIGNIFICANT CHANGE UP (ref 3.8–10.5)

## 2024-06-16 PROCEDURE — 71046 X-RAY EXAM CHEST 2 VIEWS: CPT

## 2024-06-16 PROCEDURE — 84484 ASSAY OF TROPONIN QUANT: CPT

## 2024-06-16 PROCEDURE — 85379 FIBRIN DEGRADATION QUANT: CPT

## 2024-06-16 PROCEDURE — 71275 CT ANGIOGRAPHY CHEST: CPT | Mod: 26,MC

## 2024-06-16 PROCEDURE — 87637 SARSCOV2&INF A&B&RSV AMP PRB: CPT

## 2024-06-16 PROCEDURE — 93005 ELECTROCARDIOGRAM TRACING: CPT

## 2024-06-16 PROCEDURE — 85025 COMPLETE CBC W/AUTO DIFF WBC: CPT

## 2024-06-16 PROCEDURE — 99285 EMERGENCY DEPT VISIT HI MDM: CPT

## 2024-06-16 PROCEDURE — 99285 EMERGENCY DEPT VISIT HI MDM: CPT | Mod: 25

## 2024-06-16 PROCEDURE — 80053 COMPREHEN METABOLIC PANEL: CPT

## 2024-06-16 PROCEDURE — 83880 ASSAY OF NATRIURETIC PEPTIDE: CPT

## 2024-06-16 PROCEDURE — 71046 X-RAY EXAM CHEST 2 VIEWS: CPT | Mod: 26

## 2024-06-16 PROCEDURE — 96374 THER/PROPH/DIAG INJ IV PUSH: CPT | Mod: XU

## 2024-06-16 PROCEDURE — 36415 COLL VENOUS BLD VENIPUNCTURE: CPT

## 2024-06-16 PROCEDURE — 71275 CT ANGIOGRAPHY CHEST: CPT | Mod: MC

## 2024-06-16 PROCEDURE — 93010 ELECTROCARDIOGRAM REPORT: CPT

## 2024-06-16 RX ORDER — ACETAMINOPHEN 500 MG
1000 TABLET ORAL ONCE
Refills: 0 | Status: COMPLETED | OUTPATIENT
Start: 2024-06-16 | End: 2024-06-16

## 2024-06-16 RX ORDER — ALPRAZOLAM 0.25 MG
1 TABLET ORAL
Qty: 10 | Refills: 0
Start: 2024-06-16 | End: 2024-06-20

## 2024-06-16 RX ADMIN — Medication 400 MILLIGRAM(S): at 09:51

## 2024-06-16 NOTE — ED ADULT TRIAGE NOTE - CHIEF COMPLAINT QUOTE
pt states that she has been having worsening SOB for the past 5 days. pt is c/o a headache.  pt denies fever and chills.

## 2024-06-16 NOTE — ED PROVIDER NOTE - NSFOLLOWUPINSTRUCTIONS_ED_ALL_ED_FT
Shortness of Breath, Adult  Shortness of breath means you have trouble breathing. Shortness of breath could be a sign of a medical problem.    Follow these instructions at home:  A sign showing that a person should not smoke.  Pollution    Do not smoke or use any products that contain nicotine or tobacco. If you need help quitting, ask your doctor.  Avoid things that can make it harder to breathe, such as:  Smoke of all kinds. This includes smoke from campfires or forest fires. Do not smoke or allow others to smoke in your home.  Mold.  Dust.  Air pollution.  Chemical smells.  Things that can give you an allergic reaction (allergens) if you have allergies.  Keep your living space clean. Use products that help remove mold and dust.  General instructions    Watch for any changes in your symptoms.  Take over-the-counter and prescription medicines only as told by your doctor. This includes oxygen therapy and inhaled medicines.  Rest as needed.  Return to your normal activities when your doctor says that it is safe.  Keep all follow-up visits.  Contact a doctor if:  Your condition does not get better as soon as expected.  You have a hard time doing your normal activities, even after you rest.  You have new symptoms.  You cannot walk up stairs.  You cannot exercise the way you normally do.  Get help right away if:  Your shortness of breath gets worse.  You have trouble breathing when you are resting.  You feel light-headed or you faint.  You have a cough that is not helped by medicines.  You cough up blood.  You have pain with breathing.  You have pain in your chest, arms, shoulders, or belly (abdomen).  You have a fever.  These symptoms may be an emergency. Get help right away. Call 911.  Do not wait to see if the symptoms will go away.  Do not drive yourself to the hospital.  Summary  Shortness of breath is when you have trouble breathing enough air. It can be a sign of a medical problem.  Avoid things that make it hard for you to breathe, such as smoking, pollution, mold, and dust.  Watch for any changes in your symptoms. Contact your doctor if you do not get better or you get worse.  This information is not intended to replace advice given to you by your health care provider. Make sure you discuss any questions you have with your health care provider.    Document Revised: 08/06/2022 Document Reviewed: 08/06/2022  Elsevier Patient Education © 2024 [employer]

## 2024-06-16 NOTE — ED ADULT TRIAGE NOTE - NSSEPSISSUSPECTED_ED_A_ED
[address] [facility] EMERGENCY DEPT  [address] [facility] [address]  [phone]    Work/School Note    Date: 7/15/2022     To Whom It May concern:    Galilea Blanton was evaluated by the following provider(s):  Attending Provider: Gabrielle Bonilla MD  Physician Assistant: Abhinav Lagunas virus is suspected. Per the CDC guidelines we recommend home isolation until the following conditions are all met:    1. At least five days have passed since symptoms first appeared and/or had a close exposure,   2. After home isolation for five days, wearing a mask around others for the next five days,  3. At least 24 have passed since last fever without the use of fever-reducing medications and  4.  Symptoms (eg cough, shortness of breath) have improved      Sincerely,          [facility], ELSY
No

## 2024-06-16 NOTE — ED ADULT NURSE NOTE - ALCOHOL PRE SCREEN (AUDIT - C)
Please call and notify patient that there were some early beats on the holter monitor but this is benign. The episode of shortness of breath she reported corresponds to normal rhythm just a faster heart rate. Statement Selected

## 2024-06-16 NOTE — ED PROVIDER NOTE - OBJECTIVE STATEMENT
74-year-old female with significant past medical history for SBO, SVT status post cardiac pacemaker presents to the ED with complaints of shortness of breath x 5 days.  Patient states that she cannot take a full breath each time.  Patient is under a lot of stress recently.  Patient also complaining of difficulty sleeping.  Patient denies any chest pain, fevers, body aches, sick contacts, recent travel.

## 2024-06-16 NOTE — ED PROVIDER NOTE - CLINICAL SUMMARY MEDICAL DECISION MAKING FREE TEXT BOX
74-year-old female with complaints of shortness of breath x 5 days.  Cardiac workup rule out ACS, D-dimer rule out PE, BNP rule out CHF exacerbation, x-ray rule out pneumonia.

## 2024-06-16 NOTE — ED ADULT NURSE NOTE - ED STAT RN HANDOFF DETAILS
discharge instructions explained and given to patient. patient safely discharged from unit with daughter.

## 2024-06-16 NOTE — ED PROVIDER NOTE - CARE PROVIDER_API CALL
Amira Dumont  Pulmonary Disease  [address] [place] [address]  Phone: [phone]  Fax: [phone]  Follow Up Time:

## 2024-06-16 NOTE — ED PROVIDER NOTE - PATIENT PORTAL LINK FT
You can access the FollowMyHealth Patient Portal offered by [facility] by registering at the following website: [email or web address]. By joining [employer]’s FollowMyHealth portal, you will also be able to view your health information using other applications (apps) compatible with our system.

## 2024-06-24 ENCOUNTER — APPOINTMENT (OUTPATIENT)
Dept: PULMONOLOGY | Facility: CLINIC | Age: 74
End: 2024-06-24
Payer: MEDICARE

## 2024-06-24 VITALS
BODY MASS INDEX: 24.84 KG/M2 | DIASTOLIC BLOOD PRESSURE: 64 MMHG | TEMPERATURE: 98.2 F | SYSTOLIC BLOOD PRESSURE: 103 MMHG | OXYGEN SATURATION: 95 % | HEART RATE: 82 BPM | HEIGHT: 60 IN | RESPIRATION RATE: 15 BRPM | WEIGHT: 126.5 LBS

## 2024-06-24 DIAGNOSIS — Z87.09 PERSONAL HISTORY OF OTHER DISEASES OF THE RESPIRATORY SYSTEM: ICD-10-CM

## 2024-06-24 DIAGNOSIS — R06.09 OTHER FORMS OF DYSPNEA: ICD-10-CM

## 2024-06-24 DIAGNOSIS — F17.200 NICOTINE DEPENDENCE, UNSPECIFIED, UNCOMPLICATED: ICD-10-CM

## 2024-06-24 PROCEDURE — 94727 GAS DIL/WSHOT DETER LNG VOL: CPT

## 2024-06-24 PROCEDURE — 94729 DIFFUSING CAPACITY: CPT

## 2024-06-24 PROCEDURE — 99205 OFFICE O/P NEW HI 60 MIN: CPT | Mod: 25

## 2024-06-24 PROCEDURE — 99406 BEHAV CHNG SMOKING 3-10 MIN: CPT

## 2024-06-24 PROCEDURE — 94060 EVALUATION OF WHEEZING: CPT

## 2024-06-24 RX ORDER — IBUPROFEN 600 MG/1
600 TABLET, FILM COATED ORAL 3 TIMES DAILY
Qty: 60 | Refills: 1 | Status: DISCONTINUED | COMMUNITY
Start: 2023-04-20 | End: 2024-06-24

## 2024-06-24 RX ORDER — NICOTINE POLACRILEX 4 MG/1
4 LOZENGE ORAL
Qty: 1 | Refills: 3 | Status: ACTIVE | COMMUNITY
Start: 2024-06-24 | End: 1900-01-01

## 2024-06-24 RX ORDER — METHYLPREDNISOLONE 4 MG/1
4 TABLET ORAL
Qty: 1 | Refills: 0 | Status: DISCONTINUED | COMMUNITY
Start: 2023-08-17 | End: 2024-06-24

## 2024-06-24 RX ORDER — SODIUM SULFATE, POTASSIUM SULFATE, MAGNESIUM SULFATE 17.5; 3.13; 1.6 G/ML; G/ML; G/ML
17.5-3.13-1.6 SOLUTION, CONCENTRATE ORAL
Qty: 1 | Refills: 0 | Status: DISCONTINUED | COMMUNITY
Start: 2021-06-09 | End: 2024-06-24

## 2024-06-24 RX ORDER — FAMOTIDINE 20 MG/1
20 TABLET, FILM COATED ORAL
Qty: 60 | Refills: 1 | Status: ACTIVE | COMMUNITY
Start: 2024-06-24 | End: 1900-01-01

## 2024-06-24 RX ORDER — NICOTINE 21 MG/24HR
14 PATCH, TRANSDERMAL 24 HOURS TRANSDERMAL DAILY
Qty: 28 | Refills: 3 | Status: ACTIVE | COMMUNITY
Start: 2024-06-24 | End: 1900-01-01

## 2024-07-18 ENCOUNTER — NON-APPOINTMENT (OUTPATIENT)
Age: 74
End: 2024-07-18

## 2024-07-23 ENCOUNTER — APPOINTMENT (OUTPATIENT)
Dept: ORTHOPEDIC SURGERY | Facility: CLINIC | Age: 74
End: 2024-07-23

## 2024-07-31 ENCOUNTER — APPOINTMENT (OUTPATIENT)
Dept: ELECTROPHYSIOLOGY | Facility: CLINIC | Age: 74
End: 2024-07-31
Payer: MEDICARE

## 2024-07-31 VITALS
BODY MASS INDEX: 24.74 KG/M2 | WEIGHT: 126 LBS | HEART RATE: 60 BPM | DIASTOLIC BLOOD PRESSURE: 62 MMHG | SYSTOLIC BLOOD PRESSURE: 110 MMHG | HEIGHT: 60 IN

## 2024-07-31 PROCEDURE — 99205 OFFICE O/P NEW HI 60 MIN: CPT | Mod: 25

## 2024-07-31 PROCEDURE — 93000 ELECTROCARDIOGRAM COMPLETE: CPT

## 2024-07-31 NOTE — DISCUSSION/SUMMARY
[FreeTextEntry1] : 73 year old woman with history of SVT s/p remote AVJ ablation and PPM implant, s/p ppm generator replacement 12/11/2020, presenting for EP evaluation.  She has had episodes of NSVT noted on her device interrogation, which has been asymptomatic. Recent NSVT lasted up to 11 seconds with rapid rates. She has had normal LV function on TTE and no ischemia on stress. In absence of symptoms or sustained VT, will continue beta blocker for management. As she is already on a relatively high dose of metoprolol, will continue this for now.  -continue metoprolol 50mg bid -would repeat TTE yearly, and stress testing as indicated -routine device check and EP followup in 6 mo or prn [EKG obtained to assist in diagnosis and management of assessed problem(s)] : EKG obtained to assist in diagnosis and management of assessed problem(s)

## 2024-07-31 NOTE — HISTORY OF PRESENT ILLNESS
[FreeTextEntry1] : 74 year old woman with history of SVT s/p remote AVJ ablation and PPM implant, s/p ppm generator replacement 12/11/2020, COPD, presenting for EP evaluation and evaluation of NSVT.   She initially had highly symptomatic SVT in 1996 and presented with severe palpitation and chest pain, and was found to have tachycardia up to 300 bpm. She was told she had "multiple pathways" and some close to the AV node, and had multiple attempts at ablation at [facility], but eventually required AV junction ablation and pacemaker implant in 1997. She did feel better after that. She has been pacemaker dependent since her AVJ ablation. She has a history of lead fracture resulting in symptomatic bradycardia and underwent lead extraction with new pacing system implant on 5/11/10 (Dr. Thomson, [facility]). She underwent uncomplicated ppm generator replacement 12/11/2020. She did have pain in her left arm after the procedure, which was thought to be orthopedic or due to her cervical radiculopathy.   Recently on device interrogation she has had episodes of NSVT; on 7/22/24 she had NSVT lasting 11 seconds with rate 200 bpm. Prior episodes lasted 7- 22 beats ( at  up to 217 bpm), and brief episodes of pAT lasting seconds. ] She has been mostly asymptomatic with this. She has noted occasional lightheadedness with sensation of movement- she has a history of sinus headaches and suspected inner ear issue for which she has seen ENT.  TTE in 12/2023 noted LVEF 56% with normal LV function, and stress test was normal 12/2023.  She has been taking metoprolol 50mg bid.   Event monitor 11/21-12/4/23 revealed sinus rhythm  (avg 72, range  bpm), 8% APCs, and brief runs of PAT up to 9 beats.    Interrogation of her MDT dual chamber ppm today reveals normal function in DDD 60 mode. She had 99.8% ventricular pacing, and 24% atrial pacing in DDD  mode. Overall AF burden has been <0.1%. Lead parameters are within normal limits (A 4076, implanted 5-11-10,, V:3830, implanted 5-11-10, threshold 1.375V@0.4ms).  Current meds include ASA 81 mg, metoprolol 50mg bid

## 2024-07-31 NOTE — PHYSICAL EXAM
[General Appearance - Well Developed] : well developed [Normal Appearance] : normal appearance [Well Groomed] : well groomed [General Appearance - Well Nourished] : well nourished [General Appearance - In No Acute Distress] : no acute distress [Normal Conjunctiva] : the conjunctiva exhibited no abnormalities [Normal Oropharynx] : normal oropharynx [Normal Jugular Venous V Waves Present] : normal jugular venous V waves present [Respiration, Rhythm And Depth] : normal respiratory rhythm and effort [Auscultation Breath Sounds / Voice Sounds] : lungs were clear to auscultation bilaterally [Heart Sounds] : normal S1 and S2 [Murmurs] : no murmurs present [Arterial Pulses Normal] : the arterial pulses were normal [Edema] : no peripheral edema present [Bowel Sounds] : normal bowel sounds [Abdomen Soft] : soft [Abnormal Walk] : normal gait [Nail Clubbing] : no clubbing of the fingernails [Cyanosis, Localized] : no localized cyanosis [Skin Color & Pigmentation] : normal skin color and pigmentation [Skin Turgor] : normal skin turgor [] : no rash [Oriented To Time, Place, And Person] : oriented to person, place, and time [Impaired Insight] : insight and judgment were intact [FreeTextEntry1] : left chest ppm implant site well healed

## 2024-07-31 NOTE — CARDIOLOGY SUMMARY
[___] : [unfilled] [de-identified] : 7/31/24 atrial and ventricular pacing 60 bpm [de-identified] : 12/21/23 normal nuclear stress test. LVEF 73% [de-identified] : TTE 12/20/23 LVEF 56%, LA 3.3cm, trace MR, trace TR, RVSP 27,

## 2024-07-31 NOTE — HISTORY OF PRESENT ILLNESS
[FreeTextEntry1] : 74 year old woman with history of SVT s/p remote AVJ ablation and PPM implant, s/p ppm generator replacement 12/11/2020, COPD, presenting for EP evaluation and evaluation of NSVT.   She initially had highly symptomatic SVT in 1996 and presented with severe palpitation and chest pain, and was found to have tachycardia up to 300 bpm. She was told she had "multiple pathways" and some close to the AV node, and had multiple attempts at ablation at [facility], but eventually required AV junction ablation and pacemaker implant in 1997. She did feel better after that. She has been pacemaker dependent since her AVJ ablation. She has a history of lead fracture resulting in symptomatic bradycardia and underwent lead extraction with new pacing system implant on 5/11/10 (Dr. Thomson, [facility]). She underwent uncomplicated ppm generator replacement 12/11/2020. She did have pain in her left arm after the procedure, which was thought to be orthopedic or due to her cervical radiculopathy.   Recently on device interrogation she has had episodes of NSVT; on 7/22/24 she had NSVT lasting 11 seconds with rate 200 bpm. Prior episodes lasted 7- 22 beats ( at  up to 217 bpm), and brief episodes of pAT lasting seconds. ] She has been mostly asymptomatic with this. She has noted occasional lightheadedness with sensation of movement- she has a history of sinus headaches and suspected inner ear issue for which she has seen ENT.  TTE in 12/2023 noted LVEF 56% with normal LV function, and stress test was normal 12/2023.  She has been taking metoprolol 50mg bid.   Event monitor 11/21-12/4/23 revealed sinus rhythm  (avg 72, range  bpm), 8% APCs, and brief runs of PAT up to 9 beats.    Interrogation of her MDT dual chamber ppm today reveals normal function in DDD 60 mode. She had 99.8% ventricular pacing, and 24% atrial pacing in DDD  mode. Overall AF burden has been <0.1%. Lead parameters are within normal limits (A 4076, implanted 5-11-10,, V:3830, implanted 5-11-10, threshold 1.375V@0.4ms).  Current meds include ASA 81 mg, metoprolol 50mg bid

## 2024-07-31 NOTE — REASON FOR VISIT
[Arrhythmia/ECG Abnorrmalities] : arrhythmia/ECG abnormalities [FreeTextEntry3] : Dr Richey [FreeTextEntry1] :  pt seen in 2020

## 2024-07-31 NOTE — CARDIOLOGY SUMMARY
[___] : [unfilled] [de-identified] : 7/31/24 atrial and ventricular pacing 60 bpm [de-identified] : 12/21/23 normal nuclear stress test. LVEF 73% [de-identified] : TTE 12/20/23 LVEF 56%, LA 3.3cm, trace MR, trace TR, RVSP 27,

## 2024-09-30 ENCOUNTER — APPOINTMENT (OUTPATIENT)
Dept: GASTROENTEROLOGY | Facility: CLINIC | Age: 74
End: 2024-09-30

## 2024-10-10 NOTE — HISTORY OF PRESENT ILLNESS
[FreeTextEntry1] : 70 year old woman with history of SVT s/p remote AVJ ablation and PPM implant, presenting for PPM management. \par She initially had highly symptomatic SVT in 1996 and presented with severe palpitation and chest pain, and was found to have tachycardia up to 300 bpm. She was told she had “multiple pathways” and some close to the AV node, and had multiple attempts at ablation at [facility], but eventually required AV junction ablation and pacemaker implant in 1997. She did feel better after that. She has been pacemaker dependent since her AVJ ablation. She has a history of lead fracture resulting in symptomatic bradycardia and underwent lead extraction with new pacing system implant on 5/11/10 (Dr. Thomson, [facility]). \par She has recently generally been feeling well, with occasional palpitation which has been improved with metoprolol. \par Over the last two to three weeks she has had fatigue, lightheadedness and palpitation. \par On evaluation her ppm has reached JANET and has triggered asynchronous pacing mode. \par Interrogation of her MDT dual chamber ppm today reveals the device triggered RRT 11/25/20, and has since been in VVI 65 bpm mode. Previously she had 100% ventricular pacing, and 46% atrial pacing in DDD  mode. In the past several high rate atrial episodes were noted c/w frequent PACs. Battery is currently 2.62V. Lead parameters are within normal limits (A 4076, implanted 5-11-10, threshold 0.375V@0.4ms, V:3830, implanted 5-11-10, threshold 1V@0.4ms).\par \par Current meds include ASA 81 mg, metoprolol 25mg qd\par  DISPLAY PLAN FREE TEXT

## 2024-10-21 ENCOUNTER — APPOINTMENT (OUTPATIENT)
Dept: PULMONOLOGY | Facility: CLINIC | Age: 74
End: 2024-10-21

## 2024-10-30 NOTE — ED ADULT NURSE NOTE - CHPI ED NUR SYMPTOMS NEG
High Risk/yes no body aches/no chest pain/no chills/no cough/no diaphoresis/no edema/no fever/no hemoptysis/no wheezing

## 2024-12-10 ENCOUNTER — APPOINTMENT (OUTPATIENT)
Dept: ORTHOPEDIC SURGERY | Facility: CLINIC | Age: 74
End: 2024-12-10
Payer: MEDICARE

## 2024-12-10 DIAGNOSIS — M47.814 SPONDYLOSIS W/OUT MYELOPATHY OR RADICULOPATHY, THORACIC REGION: ICD-10-CM

## 2024-12-10 DIAGNOSIS — M54.6 PAIN IN THORACIC SPINE: ICD-10-CM

## 2024-12-10 DIAGNOSIS — M79.18 MYALGIA, OTHER SITE: ICD-10-CM

## 2024-12-10 DIAGNOSIS — M25.512 PAIN IN LEFT SHOULDER: ICD-10-CM

## 2024-12-10 DIAGNOSIS — M47.812 SPONDYLOSIS W/OUT MYELOPATHY OR RADICULOPATHY, CERVICAL REGION: ICD-10-CM

## 2024-12-10 PROCEDURE — 99214 OFFICE O/P EST MOD 30 MIN: CPT | Mod: 25

## 2024-12-10 PROCEDURE — 72070 X-RAY EXAM THORAC SPINE 2VWS: CPT

## 2024-12-10 PROCEDURE — 72040 X-RAY EXAM NECK SPINE 2-3 VW: CPT

## 2024-12-10 PROCEDURE — 20552 NJX 1/MLT TRIGGER POINT 1/2: CPT

## 2024-12-10 RX ORDER — CYCLOBENZAPRINE HYDROCHLORIDE 10 MG/1
10 TABLET, FILM COATED ORAL
Qty: 60 | Refills: 1 | Status: ACTIVE | COMMUNITY
Start: 2024-12-10 | End: 1900-01-01

## 2025-01-16 ENCOUNTER — APPOINTMENT (OUTPATIENT)
Dept: ORTHOPEDIC SURGERY | Facility: CLINIC | Age: 75
End: 2025-01-16

## 2025-01-16 ENCOUNTER — APPOINTMENT (OUTPATIENT)
Dept: GASTROENTEROLOGY | Facility: CLINIC | Age: 75
End: 2025-01-16

## 2025-01-17 ENCOUNTER — APPOINTMENT (OUTPATIENT)
Dept: OTOLARYNGOLOGY | Facility: CLINIC | Age: 75
End: 2025-01-17

## 2025-02-25 NOTE — PRE-OP CHECKLIST - NS PREOP CHK AICD CARD
I called the patient the esophagram results.  This demonstrated a possible distal esophageal stricture.  Due to this finding I recommended referring the patient back to Dr. Abdul for possible dilatation.  She is agreeable and wished to proceed.      Electronically signed by WIL BAUGH PA-C on 2/25/25 at 4:02 PM CST     no

## 2025-03-10 ENCOUNTER — APPOINTMENT (OUTPATIENT)
Dept: OTOLARYNGOLOGY | Facility: CLINIC | Age: 75
End: 2025-03-10

## 2025-04-04 NOTE — ED ADULT NURSE NOTE - GENITOURINARY ASSESSMENT
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 She has been on tamoxifen and tolerating therapy since 2016. Reviewed her CT chest and MRI of the abdomen done 2025. She has no new signs or symptoms of breast cancer recurrence. She will continue with tamoxifen. Will check LFTs today along with lipid panel. Questions answered to her satisfaction. She is agreeable with plan. Next follow up in 6 months but earlier if any new symptoms. Will obtain thyroid u/s to evaluate for thyroid nodules prior to her starting on GLP1 agonists: her PCP will be starting her on therapy for weight loss.    - - -

## 2025-05-12 ENCOUNTER — NON-APPOINTMENT (OUTPATIENT)
Age: 75
End: 2025-05-12

## 2025-05-14 ENCOUNTER — APPOINTMENT (OUTPATIENT)
Dept: OTOLARYNGOLOGY | Facility: CLINIC | Age: 75
End: 2025-05-14

## 2025-05-14 ENCOUNTER — NON-APPOINTMENT (OUTPATIENT)
Age: 75
End: 2025-05-14

## 2025-05-14 VITALS
HEART RATE: 97 BPM | SYSTOLIC BLOOD PRESSURE: 114 MMHG | HEIGHT: 60 IN | WEIGHT: 122 LBS | BODY MASS INDEX: 23.95 KG/M2 | DIASTOLIC BLOOD PRESSURE: 72 MMHG

## 2025-05-14 DIAGNOSIS — J38.4 EDEMA OF LARYNX: ICD-10-CM

## 2025-05-14 DIAGNOSIS — J31.0 CHRONIC RHINITIS: ICD-10-CM

## 2025-05-14 DIAGNOSIS — J35.1 HYPERTROPHY OF TONSILS: ICD-10-CM

## 2025-05-14 DIAGNOSIS — J38.00 PARALYSIS OF VOCAL CORDS AND LARYNX, UNSPECIFIED: ICD-10-CM

## 2025-05-14 DIAGNOSIS — H61.21 IMPACTED CERUMEN, RIGHT EAR: ICD-10-CM

## 2025-05-14 DIAGNOSIS — K21.9 GASTRO-ESOPHAGEAL REFLUX DISEASE W/OUT ESOPHAGITIS: ICD-10-CM

## 2025-05-14 DIAGNOSIS — H90.3 SENSORINEURAL HEARING LOSS, BILATERAL: ICD-10-CM

## 2025-05-14 DIAGNOSIS — M47.812 SPONDYLOSIS W/OUT MYELOPATHY OR RADICULOPATHY, CERVICAL REGION: ICD-10-CM

## 2025-05-14 DIAGNOSIS — R09.89 OTHER SPECIFIED SYMPTOMS AND SIGNS INVOLVING THE CIRCULATORY AND RESPIRATORY SYSTEMS: ICD-10-CM

## 2025-05-14 PROCEDURE — 31231 NASAL ENDOSCOPY DX: CPT

## 2025-05-14 PROCEDURE — 99204 OFFICE O/P NEW MOD 45 MIN: CPT | Mod: 25

## 2025-05-14 PROCEDURE — G0268 REMOVAL OF IMPACTED WAX MD: CPT | Mod: RT

## 2025-05-14 PROCEDURE — 92557 COMPREHENSIVE HEARING TEST: CPT

## 2025-05-14 PROCEDURE — 92567 TYMPANOMETRY: CPT

## 2025-05-14 RX ORDER — IPRATROPIUM BROMIDE 42 UG/1
0.06 SPRAY, METERED NASAL
Qty: 1 | Refills: 5 | Status: ACTIVE | COMMUNITY
Start: 2025-05-14 | End: 1900-01-01

## 2025-05-14 RX ORDER — ASCORBIC ACID 500 MG
TABLET ORAL
Refills: 0 | Status: ACTIVE | COMMUNITY

## 2025-09-01 ENCOUNTER — EMERGENCY (EMERGENCY)
Facility: HOSPITAL | Age: 75
LOS: 1 days | End: 2025-09-01
Attending: EMERGENCY MEDICINE | Admitting: EMERGENCY MEDICINE
Payer: MEDICARE

## 2025-09-01 VITALS
RESPIRATION RATE: 18 BRPM | OXYGEN SATURATION: 100 % | HEART RATE: 72 BPM | SYSTOLIC BLOOD PRESSURE: 99 MMHG | DIASTOLIC BLOOD PRESSURE: 6 MMHG

## 2025-09-01 VITALS
RESPIRATION RATE: 17 BRPM | HEIGHT: 60 IN | TEMPERATURE: 97 F | OXYGEN SATURATION: 94 % | SYSTOLIC BLOOD PRESSURE: 118 MMHG | DIASTOLIC BLOOD PRESSURE: 73 MMHG | HEART RATE: 67 BPM | WEIGHT: 125 LBS

## 2025-09-01 DIAGNOSIS — Z98.49 CATARACT EXTRACTION STATUS, UNSPECIFIED EYE: Chronic | ICD-10-CM

## 2025-09-01 DIAGNOSIS — Z98.891 HISTORY OF UTERINE SCAR FROM PREVIOUS SURGERY: Chronic | ICD-10-CM

## 2025-09-01 LAB
ALBUMIN SERPL ELPH-MCNC: 3.4 G/DL — SIGNIFICANT CHANGE UP (ref 3.3–5)
ALP SERPL-CCNC: 84 U/L — SIGNIFICANT CHANGE UP (ref 40–120)
ALT FLD-CCNC: 17 U/L — SIGNIFICANT CHANGE UP (ref 12–78)
ANION GAP SERPL CALC-SCNC: 4 MMOL/L — LOW (ref 5–17)
AST SERPL-CCNC: 25 U/L — SIGNIFICANT CHANGE UP (ref 15–37)
BASOPHILS # BLD AUTO: 0.03 K/UL — SIGNIFICANT CHANGE UP (ref 0–0.2)
BASOPHILS NFR BLD AUTO: 0.4 % — SIGNIFICANT CHANGE UP (ref 0–2)
BILIRUB SERPL-MCNC: 0.2 MG/DL — SIGNIFICANT CHANGE UP (ref 0.2–1.2)
BUN SERPL-MCNC: 26 MG/DL — HIGH (ref 7–23)
CALCIUM SERPL-MCNC: 8.8 MG/DL — SIGNIFICANT CHANGE UP (ref 8.5–10.1)
CHLORIDE SERPL-SCNC: 110 MMOL/L — HIGH (ref 96–108)
CO2 SERPL-SCNC: 26 MMOL/L — SIGNIFICANT CHANGE UP (ref 22–31)
CREAT SERPL-MCNC: 0.79 MG/DL — SIGNIFICANT CHANGE UP (ref 0.5–1.3)
EGFR: 79 ML/MIN/1.73M2 — SIGNIFICANT CHANGE UP
EGFR: 79 ML/MIN/1.73M2 — SIGNIFICANT CHANGE UP
EOSINOPHIL # BLD AUTO: 0.1 K/UL — SIGNIFICANT CHANGE UP (ref 0–0.5)
EOSINOPHIL NFR BLD AUTO: 1.5 % — SIGNIFICANT CHANGE UP (ref 0–6)
GLUCOSE SERPL-MCNC: 102 MG/DL — HIGH (ref 70–99)
HCT VFR BLD CALC: 39 % — SIGNIFICANT CHANGE UP (ref 34.5–45)
HGB BLD-MCNC: 13.1 G/DL — SIGNIFICANT CHANGE UP (ref 11.5–15.5)
IMM GRANULOCYTES # BLD AUTO: 0.05 K/UL — SIGNIFICANT CHANGE UP (ref 0–0.07)
IMM GRANULOCYTES NFR BLD AUTO: 0.7 % — SIGNIFICANT CHANGE UP (ref 0–0.9)
LYMPHOCYTES # BLD AUTO: 0.92 K/UL — LOW (ref 1–3.3)
LYMPHOCYTES NFR BLD AUTO: 13.5 % — SIGNIFICANT CHANGE UP (ref 13–44)
MCHC RBC-ENTMCNC: 31.6 PG — SIGNIFICANT CHANGE UP (ref 27–34)
MCHC RBC-ENTMCNC: 33.6 G/DL — SIGNIFICANT CHANGE UP (ref 32–36)
MCV RBC AUTO: 94 FL — SIGNIFICANT CHANGE UP (ref 80–100)
MONOCYTES # BLD AUTO: 0.38 K/UL — SIGNIFICANT CHANGE UP (ref 0–0.9)
MONOCYTES NFR BLD AUTO: 5.6 % — SIGNIFICANT CHANGE UP (ref 2–14)
NEUTROPHILS # BLD AUTO: 5.36 K/UL — SIGNIFICANT CHANGE UP (ref 1.8–7.4)
NEUTROPHILS NFR BLD AUTO: 78.3 % — HIGH (ref 43–77)
NRBC # BLD AUTO: 0 K/UL — SIGNIFICANT CHANGE UP (ref 0–0)
NRBC # FLD: 0 K/UL — SIGNIFICANT CHANGE UP (ref 0–0)
NRBC BLD AUTO-RTO: 0 /100 WBCS — SIGNIFICANT CHANGE UP (ref 0–0)
PLATELET # BLD AUTO: 206 K/UL — SIGNIFICANT CHANGE UP (ref 150–400)
PMV BLD: 10.3 FL — SIGNIFICANT CHANGE UP (ref 7–13)
POTASSIUM SERPL-MCNC: 3.9 MMOL/L — SIGNIFICANT CHANGE UP (ref 3.5–5.3)
POTASSIUM SERPL-SCNC: 3.9 MMOL/L — SIGNIFICANT CHANGE UP (ref 3.5–5.3)
PROT SERPL-MCNC: 6.8 G/DL — SIGNIFICANT CHANGE UP (ref 6–8.3)
RBC # BLD: 4.15 M/UL — SIGNIFICANT CHANGE UP (ref 3.8–5.2)
RBC # FLD: 12.5 % — SIGNIFICANT CHANGE UP (ref 10.3–14.5)
SODIUM SERPL-SCNC: 140 MMOL/L — SIGNIFICANT CHANGE UP (ref 135–145)
TROPONIN I, HIGH SENSITIVITY RESULT: 10.2 NG/L — SIGNIFICANT CHANGE UP
TROPONIN I, HIGH SENSITIVITY RESULT: 15.3 NG/L — SIGNIFICANT CHANGE UP
WBC # BLD: 6.84 K/UL — SIGNIFICANT CHANGE UP (ref 3.8–10.5)
WBC # FLD AUTO: 6.84 K/UL — SIGNIFICANT CHANGE UP (ref 3.8–10.5)

## 2025-09-01 PROCEDURE — 99285 EMERGENCY DEPT VISIT HI MDM: CPT

## 2025-09-01 PROCEDURE — 80053 COMPREHEN METABOLIC PANEL: CPT

## 2025-09-01 PROCEDURE — 85025 COMPLETE CBC W/AUTO DIFF WBC: CPT

## 2025-09-01 PROCEDURE — 36415 COLL VENOUS BLD VENIPUNCTURE: CPT

## 2025-09-01 PROCEDURE — 93010 ELECTROCARDIOGRAM REPORT: CPT | Mod: 76

## 2025-09-01 PROCEDURE — 70450 CT HEAD/BRAIN W/O DYE: CPT

## 2025-09-01 PROCEDURE — 70450 CT HEAD/BRAIN W/O DYE: CPT | Mod: 26

## 2025-09-01 PROCEDURE — 84484 ASSAY OF TROPONIN QUANT: CPT

## 2025-09-01 PROCEDURE — 93005 ELECTROCARDIOGRAM TRACING: CPT

## 2025-09-01 PROCEDURE — 99285 EMERGENCY DEPT VISIT HI MDM: CPT | Mod: 25

## 2025-09-01 RX ADMIN — Medication 1000 MILLILITER(S): at 07:53
